# Patient Record
Sex: FEMALE | Race: WHITE | NOT HISPANIC OR LATINO | Employment: PART TIME | URBAN - NONMETROPOLITAN AREA
[De-identification: names, ages, dates, MRNs, and addresses within clinical notes are randomized per-mention and may not be internally consistent; named-entity substitution may affect disease eponyms.]

---

## 2017-03-03 ENCOUNTER — HOSPITAL ENCOUNTER (OUTPATIENT)
Dept: LAB | Facility: MEDICAL CENTER | Age: 32
End: 2017-03-03
Attending: FAMILY MEDICINE
Payer: COMMERCIAL

## 2017-03-03 LAB
ALBUMIN SERPL BCP-MCNC: 3.9 G/DL (ref 3.2–4.9)
ALBUMIN/GLOB SERPL: 1.3 G/DL
ALP SERPL-CCNC: 49 U/L (ref 30–99)
ALT SERPL-CCNC: 6 U/L (ref 2–50)
ANION GAP SERPL CALC-SCNC: 5 MMOL/L (ref 0–11.9)
AST SERPL-CCNC: 12 U/L (ref 12–45)
BASOPHILS # BLD AUTO: 0.1 K/UL (ref 0–0.12)
BASOPHILS NFR BLD AUTO: 2 % (ref 0–1.8)
BILIRUB SERPL-MCNC: 0.5 MG/DL (ref 0.1–1.5)
BUN SERPL-MCNC: 13 MG/DL (ref 8–22)
CALCIUM SERPL-MCNC: 9.1 MG/DL (ref 8.5–10.5)
CHLORIDE SERPL-SCNC: 105 MMOL/L (ref 96–112)
CHOLEST SERPL-MCNC: 187 MG/DL (ref 100–199)
CO2 SERPL-SCNC: 28 MMOL/L (ref 20–33)
CREAT SERPL-MCNC: 0.87 MG/DL (ref 0.5–1.4)
EOSINOPHIL # BLD: 0.15 K/UL (ref 0–0.51)
EOSINOPHIL NFR BLD AUTO: 3 % (ref 0–6.9)
ERYTHROCYTE [DISTWIDTH] IN BLOOD BY AUTOMATED COUNT: 43.7 FL (ref 35.9–50)
EST. AVERAGE GLUCOSE BLD GHB EST-MCNC: 151 MG/DL
GLOBULIN SER CALC-MCNC: 2.9 G/DL (ref 1.9–3.5)
GLUCOSE SERPL-MCNC: 158 MG/DL (ref 65–99)
HBA1C MFR BLD: 6.9 % (ref 0–5.6)
HCT VFR BLD AUTO: 42.8 % (ref 37–47)
HDLC SERPL-MCNC: 68 MG/DL
HGB BLD-MCNC: 13.9 G/DL (ref 12–16)
IMM GRANULOCYTES # BLD AUTO: 0.02 K/UL (ref 0–0.11)
IMM GRANULOCYTES NFR BLD AUTO: 0.4 % (ref 0–0.9)
LDLC SERPL CALC-MCNC: 104 MG/DL
LYMPHOCYTES # BLD: 1.57 K/UL (ref 1–4.8)
LYMPHOCYTES NFR BLD AUTO: 31.4 % (ref 22–41)
MCH RBC QN AUTO: 30.7 PG (ref 27–33)
MCHC RBC AUTO-ENTMCNC: 32.5 G/DL (ref 33.6–35)
MCV RBC AUTO: 94.5 FL (ref 81.4–97.8)
MONOCYTES # BLD: 0.39 K/UL (ref 0–0.85)
MONOCYTES NFR BLD AUTO: 7.8 % (ref 0–13.4)
NEUTROPHILS # BLD: 2.77 K/UL (ref 2–7.15)
NEUTROPHILS NFR BLD AUTO: 55.4 % (ref 44–72)
NRBC # BLD AUTO: 0 K/UL
NRBC BLD-RTO: 0 /100 WBC
PLATELET # BLD AUTO: 273 K/UL (ref 164–446)
PMV BLD AUTO: 10.2 FL (ref 9–12.9)
POTASSIUM SERPL-SCNC: 4.4 MMOL/L (ref 3.6–5.5)
PROT SERPL-MCNC: 6.8 G/DL (ref 6–8.2)
RBC # BLD AUTO: 4.53 M/UL (ref 4.2–5.4)
SODIUM SERPL-SCNC: 138 MMOL/L (ref 135–145)
TRIGL SERPL-MCNC: 73 MG/DL (ref 0–149)
WBC # BLD AUTO: 5 K/UL (ref 4.8–10.8)

## 2017-03-03 PROCEDURE — 36415 COLL VENOUS BLD VENIPUNCTURE: CPT

## 2017-03-03 PROCEDURE — 80053 COMPREHEN METABOLIC PANEL: CPT

## 2017-03-03 PROCEDURE — 83036 HEMOGLOBIN GLYCOSYLATED A1C: CPT

## 2017-03-03 PROCEDURE — 80061 LIPID PANEL: CPT

## 2017-03-03 PROCEDURE — 85025 COMPLETE CBC W/AUTO DIFF WBC: CPT

## 2017-06-18 DIAGNOSIS — N63.0 BREAST LUMP IN UPPER OUTER QUADRANT: ICD-10-CM

## 2017-07-11 ENCOUNTER — HOSPITAL ENCOUNTER (OUTPATIENT)
Dept: RADIOLOGY | Facility: MEDICAL CENTER | Age: 32
End: 2017-07-11
Attending: OBSTETRICS & GYNECOLOGY
Payer: COMMERCIAL

## 2017-07-11 DIAGNOSIS — N63.0 BREAST LUMP IN UPPER OUTER QUADRANT: ICD-10-CM

## 2017-07-11 PROCEDURE — 76642 ULTRASOUND BREAST LIMITED: CPT | Mod: LT

## 2017-08-15 ENCOUNTER — APPOINTMENT (OUTPATIENT)
Dept: DERMATOLOGY | Facility: IMAGING CENTER | Age: 32
End: 2017-08-15

## 2017-09-08 ENCOUNTER — EH NON-PROVIDER (OUTPATIENT)
Dept: OCCUPATIONAL MEDICINE | Facility: CLINIC | Age: 32
End: 2017-09-08

## 2017-09-08 PROCEDURE — 94375 RESPIRATORY FLOW VOLUME LOOP: CPT

## 2017-10-05 ENCOUNTER — IMMUNIZATION (OUTPATIENT)
Dept: OCCUPATIONAL MEDICINE | Facility: CLINIC | Age: 32
End: 2017-10-05

## 2017-10-05 DIAGNOSIS — Z23 NEED FOR VACCINATION: ICD-10-CM

## 2017-10-05 PROCEDURE — 90686 IIV4 VACC NO PRSV 0.5 ML IM: CPT | Performed by: PREVENTIVE MEDICINE

## 2018-01-10 ENCOUNTER — HOSPITAL ENCOUNTER (OUTPATIENT)
Facility: MEDICAL CENTER | Age: 33
End: 2018-01-10
Attending: OBSTETRICS & GYNECOLOGY
Payer: COMMERCIAL

## 2018-01-10 ENCOUNTER — GYNECOLOGY VISIT (OUTPATIENT)
Dept: OBGYN | Facility: CLINIC | Age: 33
End: 2018-01-10
Payer: COMMERCIAL

## 2018-01-10 VITALS
DIASTOLIC BLOOD PRESSURE: 76 MMHG | HEIGHT: 61 IN | WEIGHT: 123 LBS | SYSTOLIC BLOOD PRESSURE: 122 MMHG | BODY MASS INDEX: 23.22 KG/M2

## 2018-01-10 DIAGNOSIS — N93.8 DUB (DYSFUNCTIONAL UTERINE BLEEDING): ICD-10-CM

## 2018-01-10 DIAGNOSIS — E10.9 TYPE 1 DIABETES MELLITUS WITHOUT COMPLICATION (HCC): ICD-10-CM

## 2018-01-10 PROBLEM — O09.291 HX OF PREECLAMPSIA, PRIOR PREGNANCY, CURRENTLY PREGNANT, FIRST TRIMESTER: Status: ACTIVE | Noted: 2018-01-10

## 2018-01-10 LAB — IN CLINIC OB SCAN: NORMAL

## 2018-01-10 PROCEDURE — 87591 N.GONORRHOEAE DNA AMP PROB: CPT

## 2018-01-10 PROCEDURE — 76830 TRANSVAGINAL US NON-OB: CPT | Performed by: OBSTETRICS & GYNECOLOGY

## 2018-01-10 PROCEDURE — 88175 CYTOPATH C/V AUTO FLUID REDO: CPT

## 2018-01-10 PROCEDURE — 87491 CHLMYD TRACH DNA AMP PROBE: CPT

## 2018-01-10 PROCEDURE — 99213 OFFICE O/P EST LOW 20 MIN: CPT | Mod: 25 | Performed by: OBSTETRICS & GYNECOLOGY

## 2018-01-10 NOTE — PROGRESS NOTES
"Fatou Thomas,  32 y.o.  female presents today with a C/O of :amenorrhea. Pt   Patient's last menstrual period was 2017.       Subjective : Nausea/Vomiting: No:  Abdominal /pelvic cramping : No :   vaginal bleeding:No  Patient feeling generally well       GYN ROS:  no breast pain or new or enlarging lumps on self exam, no vaginal bleeding      Past Medical History:   Diagnosis Date   • Diabetes mellitus of mother, complicating pregnancy, childbirth, or the puerperium, unspecified as to episode of care(648.00) 2015   • Encounter for long-term (current) use of insulin (CMS-[facility]) 2015   • Type II or unspecified type diabetes mellitus without mention of complication, not stated as uncontrolled        Past Surgical History:   Procedure Laterality Date   • PRIMARY C SECTION  2016    Procedure: PRIMARY C SECTION;  Surgeon: Miriam Quinones M.D.;  Location: LABOR AND DELIVERY;  Service:    • ARTHROSCOPY, KNEE     • OPEN REDUCTION         Current Birth control:  none    OB History    Para Term  AB Living   2 1 0 1   1   SAB TAB Ectopic Molar Multiple Live Births             1      # Outcome Date GA Lbr Doron/2nd Weight Sex Delivery Anes PTL Lv   2 Current            1  16 35w4d  3.475 kg (7 lb 10.6 oz) F CS-Unspec  N SILVA      Birth Comments: severe preclampsia, failed IOL              Allergy:      Patient has no known allergies.    Exam;    /76   Ht 1.549 m (5' 1\")   Wt 55.8 kg (123 lb)   LMP 2017   Breastfeeding? No   BMI 23.24 kg/m²   Well-developed well-nourished female in no apparent distress  Heart regular rate and rhythm  Lungs clear to auscultation bilaterally  Breasts bilaterally normal with no dominant masses  Abdomen is soft and nontender    Normal external female genitalia  Cervix is closed thick and high  Uterus  9 centimeters  Adnexa are bilaterally nontender with no dominant masses    Lab.    No results found for this or any " previous visit (from the past 336 hour(s)).  Ultrasound :     First trimester findings: Intrauterine gestational sac seen: yes  Gestational sac summary: fetal pole seen, yolk sac seen, fetal cardiac activity, EGA: 9 weeks + 2 days  Adnexa: cyst seen right ovary 1.25 X 2 cm  Probe type: vaginal  Ultrasound was performed and read by me      Assessment:    Intrauterine gestation at 9 weeks and 0 /7 days, with EDC 8/15/18    Plan:  4 weeks  Prenatal labs are ordered  Referral placed for perinatology   Also ordered are 24 hour baseline urine for protein  CMP  Patient has follow-up with Dr. Walton to assist management of diabetes  Patient has history of preeclampsia in previous pregnancy has been asked to begin aspirin after first trimester

## 2018-01-12 LAB
C TRACH DNA GENITAL QL NAA+PROBE: NEGATIVE
CYTOLOGY REG CYTOL: NORMAL
N GONORRHOEA DNA GENITAL QL NAA+PROBE: NEGATIVE
SPECIMEN SOURCE: NORMAL

## 2018-01-23 ENCOUNTER — HOSPITAL ENCOUNTER (OUTPATIENT)
Dept: LAB | Facility: MEDICAL CENTER | Age: 33
End: 2018-01-23
Attending: OBSTETRICS & GYNECOLOGY
Payer: COMMERCIAL

## 2018-01-23 DIAGNOSIS — N93.8 DUB (DYSFUNCTIONAL UTERINE BLEEDING): ICD-10-CM

## 2018-01-23 LAB
ABO GROUP BLD: NORMAL
ALBUMIN SERPL BCP-MCNC: 3.8 G/DL (ref 3.2–4.9)
ALBUMIN/GLOB SERPL: 1.3 G/DL
ALP SERPL-CCNC: 46 U/L (ref 30–99)
ALT SERPL-CCNC: 8 U/L (ref 2–50)
ANION GAP SERPL CALC-SCNC: 6 MMOL/L (ref 0–11.9)
APPEARANCE UR: CLEAR
AST SERPL-CCNC: 11 U/L (ref 12–45)
BASOPHILS # BLD AUTO: 1 % (ref 0–1.8)
BASOPHILS # BLD: 0.09 K/UL (ref 0–0.12)
BILIRUB SERPL-MCNC: 0.6 MG/DL (ref 0.1–1.5)
BILIRUB UR QL STRIP.AUTO: NEGATIVE
BLD GP AB SCN SERPL QL: NORMAL
BUN SERPL-MCNC: 11 MG/DL (ref 8–22)
CALCIUM SERPL-MCNC: 9.3 MG/DL (ref 8.5–10.5)
CHLORIDE SERPL-SCNC: 103 MMOL/L (ref 96–112)
CO2 SERPL-SCNC: 25 MMOL/L (ref 20–33)
COLOR UR: YELLOW
CREAT SERPL-MCNC: 0.69 MG/DL (ref 0.5–1.4)
CULTURE IF INDICATED INDCX: NO UA CULTURE
EOSINOPHIL # BLD AUTO: 0.15 K/UL (ref 0–0.51)
EOSINOPHIL NFR BLD: 1.6 % (ref 0–6.9)
ERYTHROCYTE [DISTWIDTH] IN BLOOD BY AUTOMATED COUNT: 43.8 FL (ref 35.9–50)
GLOBULIN SER CALC-MCNC: 2.9 G/DL (ref 1.9–3.5)
GLUCOSE SERPL-MCNC: 278 MG/DL (ref 65–99)
GLUCOSE UR STRIP.AUTO-MCNC: >=1000 MG/DL
HBV SURFACE AG SER QL: NEGATIVE
HCT VFR BLD AUTO: 40.5 % (ref 37–47)
HGB BLD-MCNC: 13.2 G/DL (ref 12–16)
HIV 1+2 AB+HIV1 P24 AG SERPL QL IA: NON REACTIVE
IMM GRANULOCYTES # BLD AUTO: 0.03 K/UL (ref 0–0.11)
IMM GRANULOCYTES NFR BLD AUTO: 0.3 % (ref 0–0.9)
KETONES UR STRIP.AUTO-MCNC: NEGATIVE MG/DL
LEUKOCYTE ESTERASE UR QL STRIP.AUTO: NEGATIVE
LYMPHOCYTES # BLD AUTO: 1.46 K/UL (ref 1–4.8)
LYMPHOCYTES NFR BLD: 15.9 % (ref 22–41)
MCH RBC QN AUTO: 30.8 PG (ref 27–33)
MCHC RBC AUTO-ENTMCNC: 32.6 G/DL (ref 33.6–35)
MCV RBC AUTO: 94.6 FL (ref 81.4–97.8)
MICRO URNS: ABNORMAL
MONOCYTES # BLD AUTO: 0.51 K/UL (ref 0–0.85)
MONOCYTES NFR BLD AUTO: 5.5 % (ref 0–13.4)
NEUTROPHILS # BLD AUTO: 6.96 K/UL (ref 2–7.15)
NEUTROPHILS NFR BLD: 75.7 % (ref 44–72)
NITRITE UR QL STRIP.AUTO: NEGATIVE
NRBC # BLD AUTO: 0 K/UL
NRBC BLD-RTO: 0 /100 WBC
PH UR STRIP.AUTO: 6.5 [PH]
PLATELET # BLD AUTO: 242 K/UL (ref 164–446)
PMV BLD AUTO: 10.6 FL (ref 9–12.9)
POTASSIUM SERPL-SCNC: 4.4 MMOL/L (ref 3.6–5.5)
PROT SERPL-MCNC: 6.7 G/DL (ref 6–8.2)
PROT UR QL STRIP: NEGATIVE MG/DL
RBC # BLD AUTO: 4.28 M/UL (ref 4.2–5.4)
RBC UR QL AUTO: NEGATIVE
RH BLD: NORMAL
RUBV AB SER QL: 95.2 IU/ML
SODIUM SERPL-SCNC: 134 MMOL/L (ref 135–145)
SP GR UR STRIP.AUTO: 1.01
TREPONEMA PALLIDUM IGG+IGM AB [PRESENCE] IN SERUM OR PLASMA BY IMMUNOASSAY: NON REACTIVE
UROBILINOGEN UR STRIP.AUTO-MCNC: 0.2 MG/DL
WBC # BLD AUTO: 9.2 K/UL (ref 4.8–10.8)

## 2018-01-23 PROCEDURE — 87389 HIV-1 AG W/HIV-1&-2 AB AG IA: CPT

## 2018-01-23 PROCEDURE — 85025 COMPLETE CBC W/AUTO DIFF WBC: CPT

## 2018-01-23 PROCEDURE — 86901 BLOOD TYPING SEROLOGIC RH(D): CPT

## 2018-01-23 PROCEDURE — 80053 COMPREHEN METABOLIC PANEL: CPT

## 2018-01-23 PROCEDURE — 36415 COLL VENOUS BLD VENIPUNCTURE: CPT

## 2018-01-23 PROCEDURE — 81003 URINALYSIS AUTO W/O SCOPE: CPT

## 2018-01-23 PROCEDURE — 86762 RUBELLA ANTIBODY: CPT

## 2018-01-23 PROCEDURE — 86850 RBC ANTIBODY SCREEN: CPT

## 2018-01-23 PROCEDURE — 87340 HEPATITIS B SURFACE AG IA: CPT

## 2018-01-23 PROCEDURE — 86900 BLOOD TYPING SEROLOGIC ABO: CPT

## 2018-01-23 PROCEDURE — 86780 TREPONEMA PALLIDUM: CPT

## 2018-01-24 ENCOUNTER — HOSPITAL ENCOUNTER (OUTPATIENT)
Facility: MEDICAL CENTER | Age: 33
End: 2018-01-24
Attending: OBSTETRICS & GYNECOLOGY
Payer: COMMERCIAL

## 2018-01-24 DIAGNOSIS — N93.8 DUB (DYSFUNCTIONAL UTERINE BLEEDING): ICD-10-CM

## 2018-01-24 LAB
PROT 24H UR-MCNC: NORMAL MG/24 HR (ref 30–150)
PROT 24H UR-MRATE: <4 MG/DL (ref 0–15)
SPECIMEN VOL UR: 3025 ML

## 2018-01-24 PROCEDURE — 81050 URINALYSIS VOLUME MEASURE: CPT

## 2018-01-24 PROCEDURE — 84156 ASSAY OF PROTEIN URINE: CPT

## 2018-02-07 ENCOUNTER — INITIAL PRENATAL (OUTPATIENT)
Dept: OBGYN | Facility: CLINIC | Age: 33
End: 2018-02-07
Payer: COMMERCIAL

## 2018-02-07 VITALS — BODY MASS INDEX: 23.81 KG/M2 | WEIGHT: 126 LBS | SYSTOLIC BLOOD PRESSURE: 122 MMHG | DIASTOLIC BLOOD PRESSURE: 80 MMHG

## 2018-02-07 DIAGNOSIS — O09.92 SUPERVISION OF HIGH RISK PREGNANCY IN SECOND TRIMESTER: ICD-10-CM

## 2018-02-07 DIAGNOSIS — Z98.891 PREVIOUS CESAREAN SECTION: ICD-10-CM

## 2018-02-07 PROCEDURE — 59402 PR NEW OB HIGH RISK: CPT | Performed by: OBSTETRICS & GYNECOLOGY

## 2018-02-07 NOTE — PROGRESS NOTES
Fatou Thomas is a 32 y.o.  at 13w0d here today for obstetrical visit.  Patient is without complaints.    She reports good fetal movement.  She denies vaginal bleeding.  She denies rupture of membranes.  She denies contractions.     has Type 1 diabetes mellitus (CMS-HCC); Hypertension; Encounter for long-term (current) use of insulin (CMS-HCC); and Hx of preeclampsia, prior pregnancy, currently pregnant, first trimester on her problem list.    Type 1 diabetes followed by endocrine  Baseline average for blood sugars over the last few weeks 128  Started aspirin daily  Has seen perinatology and has follow-up    Past medical history is reviewed and updated  Past surgical history is reviewed and updated  Medications reviewed and updated  Allergies reviewed and updated  Social history reviewed and updated  Family history reviewed and updated    Patient states had elevated blood pressures while at perinatology , normal today. We'll follow up in 2 weeks to recheck blood pressure    A/P IUP at 13w0d  AFP first trimester screening  1 hour glucola  known diabetic  Rhogam opos  GBS done    F/U in 2 weeks

## 2018-02-07 NOTE — PROGRESS NOTES
Doing well. Seen by  yesterday had minimally elevated bp's. Has had all labs and baseline 24hr ua =<4.0.

## 2018-02-09 ENCOUNTER — HOSPITAL ENCOUNTER (OUTPATIENT)
Dept: LAB | Facility: MEDICAL CENTER | Age: 33
End: 2018-02-09
Attending: OBSTETRICS & GYNECOLOGY
Payer: COMMERCIAL

## 2018-02-09 PROCEDURE — 36415 COLL VENOUS BLD VENIPUNCTURE: CPT

## 2018-02-09 PROCEDURE — 81508 FTL CGEN ABNOR TWO PROTEINS: CPT

## 2018-02-13 LAB
# FETUSES US: NORMAL
AGE - REPORTED: 31 YR
COLLECT DATE: NORMAL
FET CRL US.MEAS: 6.92 CM
FET NUCHAL FOLD MOM THICKNESS US.MEAS: 0.93
FET NUCHAL FOLD THICKNESS US.MEAS: 1.5 MM
GA: 13.57 WEEKS
HCG MOM SERPL: 0.58
HCG SERPL-ACNC: NORMAL IU/L
INTEGRATED SCN PATIENT-IMP: NORMAL
PAPP-A MOM SERPL: 0.51
PAPP-A SERPL-ACNC: 1806 MIU/L
PATHOLOGY STUDY: NORMAL
SONOGRAPHER NAME: NORMAL
SONOGRAPHER: NORMAL
US DATE: NORMAL

## 2018-02-15 ENCOUNTER — HOSPITAL ENCOUNTER (OUTPATIENT)
Dept: LAB | Facility: MEDICAL CENTER | Age: 33
End: 2018-02-15
Attending: INTERNAL MEDICINE
Payer: COMMERCIAL

## 2018-02-15 LAB
EST. AVERAGE GLUCOSE BLD GHB EST-MCNC: 131 MG/DL
HBA1C MFR BLD: 6.2 % (ref 0–5.6)

## 2018-02-15 PROCEDURE — 83036 HEMOGLOBIN GLYCOSYLATED A1C: CPT

## 2018-02-15 PROCEDURE — 36415 COLL VENOUS BLD VENIPUNCTURE: CPT

## 2018-02-22 ENCOUNTER — NON-PROVIDER VISIT (OUTPATIENT)
Dept: OBGYN | Facility: CLINIC | Age: 33
End: 2018-02-22
Payer: COMMERCIAL

## 2018-02-22 VITALS — SYSTOLIC BLOOD PRESSURE: 128 MMHG | DIASTOLIC BLOOD PRESSURE: 82 MMHG

## 2018-02-22 DIAGNOSIS — Z98.891 PREVIOUS CESAREAN SECTION: ICD-10-CM

## 2018-02-22 DIAGNOSIS — E10.65 TYPE 1 DIABETES MELLITUS WITH HYPERGLYCEMIA (HCC): ICD-10-CM

## 2018-02-22 DIAGNOSIS — Z79.4 ENCOUNTER FOR LONG-TERM (CURRENT) USE OF INSULIN (HCC): ICD-10-CM

## 2018-03-05 ENCOUNTER — HOSPITAL ENCOUNTER (OUTPATIENT)
Dept: LAB | Facility: MEDICAL CENTER | Age: 33
End: 2018-03-05
Attending: OBSTETRICS & GYNECOLOGY
Payer: COMMERCIAL

## 2018-03-05 PROCEDURE — 36415 COLL VENOUS BLD VENIPUNCTURE: CPT

## 2018-03-05 PROCEDURE — 82105 ALPHA-FETOPROTEIN SERUM: CPT

## 2018-03-08 LAB
# FETUSES US: NORMAL
AFP MOM SERPL: 1.76
AFP SERPL-MCNC: 71 NG/ML
AGE - REPORTED: 31.1 YR
CURRENT SMOKER: NO
FAMILY MEMBER DISEASES HX: NO
GA METHOD: NORMAL
GA: NORMAL WK
IDDM PATIENT QL: YES
INTEGRATED SCN PATIENT-IMP: NORMAL
SPECIMEN DRAWN SERPL: NORMAL

## 2018-03-12 ENCOUNTER — ROUTINE PRENATAL (OUTPATIENT)
Dept: OBGYN | Facility: CLINIC | Age: 33
End: 2018-03-12
Payer: COMMERCIAL

## 2018-03-12 VITALS — DIASTOLIC BLOOD PRESSURE: 80 MMHG | SYSTOLIC BLOOD PRESSURE: 132 MMHG | WEIGHT: 133 LBS | BODY MASS INDEX: 25.13 KG/M2

## 2018-03-12 DIAGNOSIS — Z98.891 PREVIOUS CESAREAN SECTION: ICD-10-CM

## 2018-03-12 DIAGNOSIS — O10.919 HTN IN PREGNANCY, CHRONIC: ICD-10-CM

## 2018-03-12 DIAGNOSIS — O09.92 HIGH-RISK PREGNANCY, SECOND TRIMESTER: ICD-10-CM

## 2018-03-12 PROCEDURE — 90040 PR PRENATAL FOLLOW UP: CPT | Performed by: OBSTETRICS & GYNECOLOGY

## 2018-03-12 NOTE — PROGRESS NOTES
S: Pt here for OB follow up. Doing well.   Not feeling fetal movements yet    negative vaginal bleeding.    negative leakage of fluid.    negative contractions.    Reviewed blood sugar log with patient.  Reviewed diet.  Questions answered.    O: /90 --> 132/80, Afeb      See prenatal vitals, chart for today's exam   w/ ultrasound    Working with Dr. Walton (endocrinology) for blood sugar management    Insulin regimen  Long acting insulin at night 15 units  SSI Novolog daily  7:1 Carb Ratio      A/P:  32 y.o.  17w5d with Type 1 DM who presents for obstetric follow-up.  Chronic HTN    1.  Labor precautions and fetal kick counts educated  2.  Cont to work w/ Dr. Walton for blood sugar management  3.  NSTs: 2x/week to begin at 32 weeks.  4.  Continue prenatal vitamins.  5.  Watch weight gain.  Exercise at least 30 minutes daily  6.  Increase water intake.  7.  Encouraged prenatal classes.  8.  Follow-up in 4 weeks for obstetric follow-up.  9.  Monitor blood pressures closely, cont baby ASA daily

## 2018-03-23 ENCOUNTER — HOSPITAL ENCOUNTER (OUTPATIENT)
Dept: LAB | Facility: MEDICAL CENTER | Age: 33
End: 2018-03-23
Attending: INTERNAL MEDICINE
Payer: COMMERCIAL

## 2018-03-23 LAB
EST. AVERAGE GLUCOSE BLD GHB EST-MCNC: 128 MG/DL
HBA1C MFR BLD: 6.1 % (ref 0–5.6)

## 2018-03-23 PROCEDURE — 36415 COLL VENOUS BLD VENIPUNCTURE: CPT

## 2018-03-23 PROCEDURE — 83036 HEMOGLOBIN GLYCOSYLATED A1C: CPT

## 2018-04-03 ENCOUNTER — ROUTINE PRENATAL (OUTPATIENT)
Dept: OBGYN | Facility: CLINIC | Age: 33
End: 2018-04-03
Payer: COMMERCIAL

## 2018-04-03 VITALS — DIASTOLIC BLOOD PRESSURE: 82 MMHG | BODY MASS INDEX: 25.7 KG/M2 | SYSTOLIC BLOOD PRESSURE: 124 MMHG | WEIGHT: 136 LBS

## 2018-04-03 DIAGNOSIS — O10.919 HTN IN PREGNANCY, CHRONIC: ICD-10-CM

## 2018-04-03 DIAGNOSIS — Z98.891 PREVIOUS CESAREAN SECTION: ICD-10-CM

## 2018-04-03 PROCEDURE — 90040 PR PRENATAL FOLLOW UP: CPT | Performed by: OBSTETRICS & GYNECOLOGY

## 2018-04-03 RX ORDER — METHYLDOPA 250 MG/1
250 TABLET, FILM COATED ORAL EVERY 12 HOURS
Qty: 60 TAB | Refills: 3 | Status: SHIPPED | OUTPATIENT
Start: 2018-04-03 | End: 2018-07-18

## 2018-04-03 NOTE — PROGRESS NOTES
32 y.o.   20w6d with diagnosis of GDM: pre-existing. Chronic HTN     Subjective: Fetal movement: positive, Vaginal Bleeding:negative, Loss of Fluid: negative, Following diet :positive, Insulin Use:positive. Blood sugar logs reviewed and are posted in diabetic flowsheet.   Patient Active Problem List    Diagnosis Date Noted   • Previous  section 2018     Priority: High   • Hx of preeclampsia, prior pregnancy, currently pregnant, first trimester 01/10/2018   • High-risk pregnancy, second trimester 2015   • Encounter for long-term (current) use of insulin (CMS-HCC) 2015   • Type 1 diabetes mellitus (CMS-HCC) 2015   • HTN in pregnancy, chronic 2015       Current Treatment:     AM     intensive insulin injection program    PM:    intensive insulin injection program    QHS   intensive insulin injection program    FBS Range: Dexcom machine : shows averages and occasional s[pike   Postprandial Range:    Vitals:    18 1628   BP: 124/82   Weight: 61.7 kg (136 lb)       NST:    discussed    Ass:     20w6d  GDM: Class ___D ___  Good control positive  Chronic HTN   Hx of Superimposed Pre eclampsia     P. New(No)  Continue Same ( Yes )Diabetic treatment Plan  AM: intensive insulin injection program  PM intensive insulin injection program  QHS:intensive insulin injection program  Aldomet 250 mg BID   Needs appointment with Eye doc for exam   NST 2x /week after 32 weeks  Frequent assessment of  Fetal weight  IOL /C/S delivery at 38-39 weeks

## 2018-04-05 ENCOUNTER — TELEPHONE (OUTPATIENT)
Dept: OBGYN | Facility: CLINIC | Age: 33
End: 2018-04-05

## 2018-04-05 NOTE — TELEPHONE ENCOUNTER
----- Message from Cristi Madden Ass't sent at 4/5/2018 10:15 AM PDT -----  Regarding: FW: Prescription Question  Contact: [phone]      ----- Message -----  From: Fatou Thomas  Sent: 4/4/2018  12:24 PM  To: [facility]  Subject: Prescription Question                            Blanca Quinones I wanted to talk with you after my appointment that I had with Dr. Cabrera on 4/3. He wants to start me on BP medication and I wanted to see what you thought about that? He said it was for 'chronic HTN', even though my BP in the office was 124/82. He wrote a prescription for me to start taking Aldoment BID. He also wanted to see me back in 2 weeks instead of 4 weeks. I had to make the appointment to see him again because you are booked. I had some not so pleasant words with the front staff at your office about not being able to see you as my Primary OB, I am beyond frustrated with how they are running [facility] now. I chose to come to see you, not everyone in the office. If you have any pull toward making so I only see you      Called pt back and and explained Dr. Quinones is out on a personal emergency and we are hoping she will be back by next week but unsure. Explained I discussed with Dr. Cabrera reason for her to start on aldomet. Pt stated she did not know the email she sent to Dr. Quinones was going to be seen by anybody else. Pt states  She does not want to discuss it with Dr. Cabrera and she rather wait for Dr. Quinones and she will talk to Dr. Quinones when she comes back. Explained to pt I will take a look at the schedule and see if I can do something for her to be able to see Dr. Quinones on 4/17/18. Explained to pt I will be calling her back with options if there is any. Pt agreed.  1320 called pt back and left message to call back regarding appt time for 4/17/18.   1420 pt called back and notified we are making an exception this time and we can move her to 1015 on 4/17/18 but we cannot make any  guarantees that  She will be schedule with Dr. Quinones every time. Pt agreed and verbalized understanding. Pt requesting to get a call when June's schedule opens to schedule her appt in advance. Jamal notified and will call pt.

## 2018-04-17 ENCOUNTER — ROUTINE PRENATAL (OUTPATIENT)
Dept: OBGYN | Facility: CLINIC | Age: 33
End: 2018-04-17
Payer: COMMERCIAL

## 2018-04-17 VITALS — BODY MASS INDEX: 26.26 KG/M2 | SYSTOLIC BLOOD PRESSURE: 124 MMHG | WEIGHT: 139 LBS | DIASTOLIC BLOOD PRESSURE: 82 MMHG

## 2018-04-17 DIAGNOSIS — Z98.891 PREVIOUS CESAREAN SECTION: ICD-10-CM

## 2018-04-17 DIAGNOSIS — O10.919 HTN IN PREGNANCY, CHRONIC: ICD-10-CM

## 2018-04-17 PROCEDURE — 90040 PR PRENATAL FOLLOW UP: CPT | Performed by: OBSTETRICS & GYNECOLOGY

## 2018-04-17 RX ORDER — TRIAMCINOLONE ACETONIDE 1 MG/G
OINTMENT TOPICAL
Qty: 1 TUBE | Refills: 2 | Status: ON HOLD | OUTPATIENT
Start: 2018-04-17 | End: 2018-06-22

## 2018-04-17 NOTE — LETTER
April 17, 2018       Patient: Fatou Thomas   YOB: 1985   Date of Visit: 4/17/2018         To Whom It May Concern:    It is my medical opinion that it was a medical necessity that Fatou Thomas needed to complete prenatal labs between the dates of 1/10/2018 and 2/7/2018.    If you have any questions or concerns, please don't hesitate to call [phone]          Sincerely,          Miriam Quinones M.D.  Electronically Signed

## 2018-04-17 NOTE — PROGRESS NOTES
Fatou Thomas 32 y.o.  22w6d here today for obstetrical visit. Patient reports good  fetal movement. denies contractions, denies vaginal bleeding, denies loss of fluid.    Pregnancy is complicated by   Patient Active Problem List    Diagnosis Date Noted   • Previous  section 2018     Priority: High   • HTN in pregnancy, chronic 2015     Priority: High   • Hx of preeclampsia, prior pregnancy, currently pregnant, first trimester 01/10/2018   • High-risk pregnancy, second trimester 2015   • Encounter for long-term (current) use of insulin (CMS-HCC) 2015   • Type 1 diabetes mellitus (CMS-HCC) 2015       GBS not done  Blood sugar followed by endocrine    Follow-up with Dr. Chauhan today  Patient was given Aldomet for blood pressure was as low blood pressures were normal  Would like to have patient discuss beginning blood pressure medication with perinatology this afternoon.      Insulin regimen  Per endocrine    Followup in 2 weeks   labor precautions are reviewed  Continue kick counts daily after 28 weeks  NST twice weekly after 32 weeks if on insulin

## 2018-04-17 NOTE — LETTER
April 17, 2018       Patient: Fatou Thomas   YOB: 1985   Date of Visit: 4/17/2018         To Whom It May Concern:    It is my medical opinion that Fatou Thomas needed to complete prenatal labs between the dates of 1/10/2018 and 2/7/2018 due to previous high-risk pregnancy.    If you have any questions or concerns, please don't hesitate to call [phone]          Sincerely,          Miriam Quinones M.D.  Electronically Signed

## 2018-04-18 ENCOUNTER — HOSPITAL ENCOUNTER (OUTPATIENT)
Dept: LAB | Facility: MEDICAL CENTER | Age: 33
End: 2018-04-18
Attending: INTERNAL MEDICINE
Payer: COMMERCIAL

## 2018-04-18 ENCOUNTER — TELEPHONE (OUTPATIENT)
Dept: OBGYN | Facility: MEDICAL CENTER | Age: 33
End: 2018-04-18

## 2018-04-18 PROCEDURE — 36415 COLL VENOUS BLD VENIPUNCTURE: CPT

## 2018-04-18 PROCEDURE — 83036 HEMOGLOBIN GLYCOSYLATED A1C: CPT

## 2018-04-18 NOTE — TELEPHONE ENCOUNTER
Gillian from the pharmacy called and asked if they could change the kenalog ointment to the kenalog cream for the patient dur to being cheaper and working better for the patient as she has had the cream previously and it has worked for her.

## 2018-04-19 LAB
EST. AVERAGE GLUCOSE BLD GHB EST-MCNC: 143 MG/DL
HBA1C MFR BLD: 6.6 % (ref 0–5.6)

## 2018-05-01 ENCOUNTER — ROUTINE PRENATAL (OUTPATIENT)
Dept: OBGYN | Facility: CLINIC | Age: 33
End: 2018-05-01
Payer: COMMERCIAL

## 2018-05-01 VITALS — BODY MASS INDEX: 26.83 KG/M2 | DIASTOLIC BLOOD PRESSURE: 76 MMHG | WEIGHT: 142 LBS | SYSTOLIC BLOOD PRESSURE: 134 MMHG

## 2018-05-01 DIAGNOSIS — Z98.891 PREVIOUS CESAREAN SECTION: ICD-10-CM

## 2018-05-01 DIAGNOSIS — O10.919 HTN IN PREGNANCY, CHRONIC: ICD-10-CM

## 2018-05-01 PROCEDURE — 90040 PR PRENATAL FOLLOW UP: CPT | Performed by: OBSTETRICS & GYNECOLOGY

## 2018-05-01 NOTE — PROGRESS NOTES
Fatou Thomas 32 y.o.  24w6d here today for obstetrical visit. Patient reports good  fetal movement. denies contractions, denies vaginal bleeding, denies loss of fluid.    Pregnancy is complicated by   Patient Active Problem List    Diagnosis Date Noted   • Previous  section 2018     Priority: High   • HTN in pregnancy, chronic 2015     Priority: High   • Hx of preeclampsia, prior pregnancy, currently pregnant, first trimester 01/10/2018   • High-risk pregnancy, second trimester 2015   • Encounter for long-term (current) use of insulin (HCC) 2015   • Type 1 diabetes mellitus (HCC) 2015       GBS not done  Followed by endocrine      Insulin regimen  Per endocrine    Followup in 2 weeks   labor precautions are reviewed  Continue kick counts daily after 28 weeks  NST twice weekly after 32 weeks if on insulin

## 2018-05-17 ENCOUNTER — HOSPITAL ENCOUNTER (OUTPATIENT)
Dept: LAB | Facility: MEDICAL CENTER | Age: 33
End: 2018-05-17
Attending: INTERNAL MEDICINE
Payer: COMMERCIAL

## 2018-05-17 ENCOUNTER — ROUTINE PRENATAL (OUTPATIENT)
Dept: OBGYN | Facility: CLINIC | Age: 33
End: 2018-05-17
Payer: COMMERCIAL

## 2018-05-17 VITALS — BODY MASS INDEX: 27.78 KG/M2 | WEIGHT: 147 LBS | DIASTOLIC BLOOD PRESSURE: 80 MMHG | SYSTOLIC BLOOD PRESSURE: 122 MMHG

## 2018-05-17 DIAGNOSIS — Z98.891 PREVIOUS CESAREAN SECTION: ICD-10-CM

## 2018-05-17 DIAGNOSIS — I10 ESSENTIAL HYPERTENSION: ICD-10-CM

## 2018-05-17 DIAGNOSIS — O10.919 HTN IN PREGNANCY, CHRONIC: ICD-10-CM

## 2018-05-17 LAB
EST. AVERAGE GLUCOSE BLD GHB EST-MCNC: 143 MG/DL
HBA1C MFR BLD: 6.6 % (ref 0–5.6)

## 2018-05-17 PROCEDURE — 90040 PR PRENATAL FOLLOW UP: CPT | Performed by: OBSTETRICS & GYNECOLOGY

## 2018-05-17 PROCEDURE — 36415 COLL VENOUS BLD VENIPUNCTURE: CPT

## 2018-05-17 PROCEDURE — 83036 HEMOGLOBIN GLYCOSYLATED A1C: CPT

## 2018-05-17 NOTE — PROGRESS NOTES
Fatou Thomas 32 y.o.  27w1d here today for obstetrical visit. Patient reports good  fetal movement. denies contractions, denies vaginal bleeding, denies loss of fluid.    Pregnancy is complicated by   Patient Active Problem List    Diagnosis Date Noted   • Previous  section 2018     Priority: High   • HTN in pregnancy, chronic 2015     Priority: High   • Essential hypertension 2018   • Pregnancy and insulin-dependent diabetes mellitus in second trimester ([facility]) 2018   • Hx of preeclampsia, prior pregnancy, currently pregnant, first trimester 01/10/2018   • High-risk pregnancy, second trimester 2015   • Encounter for long-term (current) use of insulin ([facility]) 2015   • Type 1 diabetes mellitus ([facility]) 2015       GBS not done  Fasting blood sugars range blood sugars followed by endocrine  Postprandial blood sugars range     Recent visit to perinatology normal fetal growth, LEYDI is 16    Insulin regimen  Per endocrinology    Followup in 2 weeks   labor precautions are reviewed  Continue kick counts daily after 28 weeks  NST twice weekly after 32 weeks if on insulin

## 2018-05-17 NOTE — LETTER
"Count Your Baby's Movements  Another step to a healthy delivery        How Many Weeks Pregnant? 27 weeks 1 day  Date to Begin Countin18              How to use this chart    One way for your physician to keep track of your baby's health is by knowing how often the baby moves (or \"kicks\") in your womb.  You can help your physician to do this by using this chart every day.    Every day, you should see how many hours it takes for your baby to move 10 times.  Start in the morning, as soon as you get up.    · First, write down the time your baby moves until you get to 10.  · Check off one box every time your baby moves until you get to 10.  · Write down the time you finished counting in the last column.  · Total how long it took to count up all 10 movements.  · Finally, fill in the box that shows how long this took.  After counting 10 movements, you no longer have to count any more that day.  The next morning, just start counting again as soon as you get up.    What should you call a \"movement\"?  It is hard to say, because it will feel different from one mother to another and from one pregnancy to the next.  The important thing is that you count the movements the same way throughout your pregnancy.  If you have more questions, you should ask your physician.    Count carefully every day!  SAMPLE:  Week 28    How many hours did it take to feel 10 movements?       Start  Time     1     2     3     4     5     6     7     8     9     10   Finish Time   Mon 8:20 ·  ·  ·  ·  ·  ·  ·  ·  ·  ·  11:40                  Sat               Sun                 IMPORTANT: You should contact your physician if it takes more than two hours for you to feel 10 movements.  Each morning, write down the time and start to count the movements of your baby.  Keep track by checking off one box every time you feel one movement.  When you have felt 10 \"kicks\", write down the time you " finished counting in the last column.  Then fill in the   box (over the check chilo) for the number of hours it took.  Be sure to read the complete instructions on the previous page.

## 2018-05-23 ENCOUNTER — DATING (OUTPATIENT)
Dept: OBGYN | Facility: MEDICAL CENTER | Age: 33
End: 2018-05-23

## 2018-05-23 DIAGNOSIS — O10.919 HTN IN PREGNANCY, CHRONIC: ICD-10-CM

## 2018-05-30 ENCOUNTER — ROUTINE PRENATAL (OUTPATIENT)
Dept: OBGYN | Facility: CLINIC | Age: 33
End: 2018-05-30
Payer: COMMERCIAL

## 2018-05-30 VITALS — WEIGHT: 148 LBS | SYSTOLIC BLOOD PRESSURE: 122 MMHG | BODY MASS INDEX: 27.96 KG/M2 | DIASTOLIC BLOOD PRESSURE: 80 MMHG

## 2018-05-30 DIAGNOSIS — Z98.891 PREVIOUS CESAREAN SECTION: ICD-10-CM

## 2018-05-30 DIAGNOSIS — O10.919 HTN IN PREGNANCY, CHRONIC: ICD-10-CM

## 2018-05-30 DIAGNOSIS — I10 ESSENTIAL HYPERTENSION: ICD-10-CM

## 2018-05-30 PROCEDURE — 90040 PR PRENATAL FOLLOW UP: CPT | Performed by: OBSTETRICS & GYNECOLOGY

## 2018-05-30 NOTE — PROGRESS NOTES
Fatou Thomas 32 y.o.  29w0d here today for obstetrical visit. Patient reports good  fetal movement. denies contractions, denies vaginal bleeding, denies loss of fluid.    Pregnancy is complicated by   Patient Active Problem List    Diagnosis Date Noted   • Previous  section 2018     Priority: High   • HTN in pregnancy, chronic 2015     Priority: High   • Essential hypertension 2018   • Pregnancy and insulin-dependent diabetes mellitus in second trimester ([facility]) 2018   • Hx of preeclampsia, prior pregnancy, currently pregnant, first trimester 01/10/2018   • High-risk pregnancy, second trimester 2015   • Encounter for long-term (current) use of insulin ([facility]) 2015   • Type 1 diabetes mellitus ([facility]) 2015       GBS not done  Fasting blood sugars range followed by Dr. Walton  Postprandial blood sugars range     Hemoglobin A1c 6.6    Insulin regimen  Managed by Dr. Walton    Currently taking Aldomet 250 twice a day  Has follow-up with Dr. Chauhan in 2 weeks    Followup in 2 weeks   labor precautions are reviewed  Continue kick counts daily after 28 weeks  NST twice weekly after 32 weeks if on insulin

## 2018-06-13 ENCOUNTER — ROUTINE PRENATAL (OUTPATIENT)
Dept: OBGYN | Facility: CLINIC | Age: 33
End: 2018-06-13
Payer: COMMERCIAL

## 2018-06-13 ENCOUNTER — HOSPITAL ENCOUNTER (OUTPATIENT)
Dept: LAB | Facility: MEDICAL CENTER | Age: 33
End: 2018-06-13
Attending: INTERNAL MEDICINE
Payer: COMMERCIAL

## 2018-06-13 VITALS — WEIGHT: 148 LBS | BODY MASS INDEX: 27.96 KG/M2 | DIASTOLIC BLOOD PRESSURE: 80 MMHG | SYSTOLIC BLOOD PRESSURE: 145 MMHG

## 2018-06-13 DIAGNOSIS — Z98.891 PREVIOUS CESAREAN SECTION: ICD-10-CM

## 2018-06-13 DIAGNOSIS — O10.919 HTN IN PREGNANCY, CHRONIC: ICD-10-CM

## 2018-06-13 DIAGNOSIS — I10 ESSENTIAL HYPERTENSION: ICD-10-CM

## 2018-06-13 LAB
EST. AVERAGE GLUCOSE BLD GHB EST-MCNC: 131 MG/DL
HBA1C MFR BLD: 6.2 % (ref 0–5.6)

## 2018-06-13 PROCEDURE — 90040 PR PRENATAL FOLLOW UP: CPT | Performed by: OBSTETRICS & GYNECOLOGY

## 2018-06-13 PROCEDURE — 83036 HEMOGLOBIN GLYCOSYLATED A1C: CPT

## 2018-06-13 PROCEDURE — 36415 COLL VENOUS BLD VENIPUNCTURE: CPT

## 2018-06-13 NOTE — PROGRESS NOTES
Fatou Thomas 32 y.o.  31w0d here today for obstetrical visit. Patient reports good  fetal movement. denies contractions, denies vaginal bleeding, denies loss of fluid.    Pregnancy is complicated by   Patient Active Problem List    Diagnosis Date Noted   • Previous  section 2018     Priority: High   • HTN in pregnancy, chronic 2015     Priority: High   • Essential hypertension 2018   • Pregnancy and insulin-dependent diabetes mellitus in second trimester ([facility]) 2018   • Hx of preeclampsia, prior pregnancy, currently pregnant, first trimester 01/10/2018   • High-risk pregnancy, second trimester 2015   • Encounter for long-term (current) use of insulin ([facility]) 2015   • Type 1 diabetes mellitus ([facility]) 2015       GBS not done  Fasting blood sugars range   Postprandial blood sugars range followed by endocrinology    Patient's blood pressure slightly high today  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 Blood pressures are minimally elevated  Currently on Aldomet 250 mg by mouth twice a day  Will repeat 24-hour urine and preeclampsia labs  Patient has follow-up with Dr. Maldonado next week NST at that time  Also NST to be done next Friday  We'll begin twice weekly NSTs after 32 weeks    Insulin regimen  Maintained by endocrinology-    Followup in 1 weeks   labor precautions are reviewed  Continue kick counts daily after 28 weeks  NST twice weekly after 32 weeks if on insulin

## 2018-06-13 NOTE — PROGRESS NOTES
OB Visit @ 31 wks.Pt doing well. Active FM.Denies ha's ,swelling or visual changes. Timo maryjane UC's infrequent .Repeat  not scheduled yet pending symptoms.

## 2018-06-19 ENCOUNTER — HOSPITAL ENCOUNTER (OUTPATIENT)
Facility: MEDICAL CENTER | Age: 33
End: 2018-06-19
Attending: OBSTETRICS & GYNECOLOGY
Payer: COMMERCIAL

## 2018-06-19 LAB
ALBUMIN SERPL BCP-MCNC: 3.2 G/DL (ref 3.2–4.9)
ALBUMIN/GLOB SERPL: 1.2 G/DL
ALP SERPL-CCNC: 88 U/L (ref 30–99)
ALT SERPL-CCNC: 10 U/L (ref 2–50)
ANION GAP SERPL CALC-SCNC: 9 MMOL/L (ref 0–11.9)
AST SERPL-CCNC: 13 U/L (ref 12–45)
BASOPHILS # BLD AUTO: 0.6 % (ref 0–1.8)
BASOPHILS # BLD: 0.05 K/UL (ref 0–0.12)
BILIRUB SERPL-MCNC: 0.3 MG/DL (ref 0.1–1.5)
BUN SERPL-MCNC: 9 MG/DL (ref 8–22)
CALCIUM SERPL-MCNC: 9.1 MG/DL (ref 8.5–10.5)
CHLORIDE SERPL-SCNC: 107 MMOL/L (ref 96–112)
CO2 SERPL-SCNC: 22 MMOL/L (ref 20–33)
CREAT SERPL-MCNC: 0.69 MG/DL (ref 0.5–1.4)
EOSINOPHIL # BLD AUTO: 0.08 K/UL (ref 0–0.51)
EOSINOPHIL NFR BLD: 1 % (ref 0–6.9)
ERYTHROCYTE [DISTWIDTH] IN BLOOD BY AUTOMATED COUNT: 47.4 FL (ref 35.9–50)
GLOBULIN SER CALC-MCNC: 2.7 G/DL (ref 1.9–3.5)
GLUCOSE SERPL-MCNC: 172 MG/DL (ref 65–99)
HCT VFR BLD AUTO: 39.2 % (ref 37–47)
HGB BLD-MCNC: 13 G/DL (ref 12–16)
IMM GRANULOCYTES # BLD AUTO: 0.04 K/UL (ref 0–0.11)
IMM GRANULOCYTES NFR BLD AUTO: 0.5 % (ref 0–0.9)
LYMPHOCYTES # BLD AUTO: 1.45 K/UL (ref 1–4.8)
LYMPHOCYTES NFR BLD: 17.7 % (ref 22–41)
MCH RBC QN AUTO: 31.9 PG (ref 27–33)
MCHC RBC AUTO-ENTMCNC: 33.2 G/DL (ref 33.6–35)
MCV RBC AUTO: 96.3 FL (ref 81.4–97.8)
MONOCYTES # BLD AUTO: 0.62 K/UL (ref 0–0.85)
MONOCYTES NFR BLD AUTO: 7.6 % (ref 0–13.4)
NEUTROPHILS # BLD AUTO: 5.95 K/UL (ref 2–7.15)
NEUTROPHILS NFR BLD: 72.6 % (ref 44–72)
NRBC # BLD AUTO: 0 K/UL
NRBC BLD-RTO: 0 /100 WBC
PLATELET # BLD AUTO: 258 K/UL (ref 164–446)
PMV BLD AUTO: 10.9 FL (ref 9–12.9)
POTASSIUM SERPL-SCNC: 3.9 MMOL/L (ref 3.6–5.5)
PROT SERPL-MCNC: 5.9 G/DL (ref 6–8.2)
RBC # BLD AUTO: 4.07 M/UL (ref 4.2–5.4)
SODIUM SERPL-SCNC: 138 MMOL/L (ref 135–145)
WBC # BLD AUTO: 8.2 K/UL (ref 4.8–10.8)

## 2018-06-19 PROCEDURE — 80053 COMPREHEN METABOLIC PANEL: CPT

## 2018-06-19 PROCEDURE — 36415 COLL VENOUS BLD VENIPUNCTURE: CPT

## 2018-06-19 PROCEDURE — 85025 COMPLETE CBC W/AUTO DIFF WBC: CPT

## 2018-06-20 ENCOUNTER — HOSPITAL ENCOUNTER (OUTPATIENT)
Facility: MEDICAL CENTER | Age: 33
End: 2018-06-20
Attending: OBSTETRICS & GYNECOLOGY
Payer: COMMERCIAL

## 2018-06-20 DIAGNOSIS — O10.919 HTN IN PREGNANCY, CHRONIC: ICD-10-CM

## 2018-06-20 LAB
PROT 24H UR-MCNC: 199.5 MG/24 HR (ref 30–150)
PROT 24H UR-MRATE: 7 MG/DL (ref 0–15)
SPECIMEN VOL UR: 2850 ML

## 2018-06-20 PROCEDURE — 81050 URINALYSIS VOLUME MEASURE: CPT

## 2018-06-20 PROCEDURE — 84156 ASSAY OF PROTEIN URINE: CPT

## 2018-06-21 ENCOUNTER — HOSPITAL ENCOUNTER (OUTPATIENT)
Facility: MEDICAL CENTER | Age: 33
End: 2018-06-21
Attending: OBSTETRICS & GYNECOLOGY | Admitting: OBSTETRICS & GYNECOLOGY
Payer: COMMERCIAL

## 2018-06-22 ENCOUNTER — HOSPITAL ENCOUNTER (INPATIENT)
Facility: MEDICAL CENTER | Age: 33
LOS: 1 days | DRG: 781 | End: 2018-06-23
Attending: OBSTETRICS & GYNECOLOGY | Admitting: OBSTETRICS & GYNECOLOGY
Payer: COMMERCIAL

## 2018-06-22 LAB
ALBUMIN SERPL BCP-MCNC: 3.5 G/DL (ref 3.2–4.9)
ALBUMIN/GLOB SERPL: 1.1 G/DL
ALP SERPL-CCNC: 82 U/L (ref 30–99)
ALT SERPL-CCNC: 8 U/L (ref 2–50)
ANION GAP SERPL CALC-SCNC: 8 MMOL/L (ref 0–11.9)
APPEARANCE UR: CLEAR
AST SERPL-CCNC: 16 U/L (ref 12–45)
BASOPHILS # BLD AUTO: 0.6 % (ref 0–1.8)
BASOPHILS # BLD: 0.05 K/UL (ref 0–0.12)
BILIRUB SERPL-MCNC: 0.5 MG/DL (ref 0.1–1.5)
BUN SERPL-MCNC: 9 MG/DL (ref 8–22)
CALCIUM SERPL-MCNC: 9.2 MG/DL (ref 8.5–10.5)
CHLORIDE SERPL-SCNC: 110 MMOL/L (ref 96–112)
CO2 SERPL-SCNC: 22 MMOL/L (ref 20–33)
COLOR UR AUTO: YELLOW
CREAT SERPL-MCNC: 0.59 MG/DL (ref 0.5–1.4)
EOSINOPHIL # BLD AUTO: 0.14 K/UL (ref 0–0.51)
EOSINOPHIL NFR BLD: 1.6 % (ref 0–6.9)
ERYTHROCYTE [DISTWIDTH] IN BLOOD BY AUTOMATED COUNT: 44.5 FL (ref 35.9–50)
GLOBULIN SER CALC-MCNC: 3.1 G/DL (ref 1.9–3.5)
GLUCOSE SERPL-MCNC: 67 MG/DL (ref 65–99)
GLUCOSE UR QL STRIP.AUTO: NEGATIVE MG/DL
HCT VFR BLD AUTO: 41.1 % (ref 37–47)
HGB BLD-MCNC: 14.2 G/DL (ref 12–16)
IMM GRANULOCYTES # BLD AUTO: 0.06 K/UL (ref 0–0.11)
IMM GRANULOCYTES NFR BLD AUTO: 0.7 % (ref 0–0.9)
KETONES UR QL STRIP.AUTO: NEGATIVE MG/DL
LEUKOCYTE ESTERASE UR QL STRIP.AUTO: NEGATIVE
LYMPHOCYTES # BLD AUTO: 1.98 K/UL (ref 1–4.8)
LYMPHOCYTES NFR BLD: 22.2 % (ref 22–41)
MCH RBC QN AUTO: 32.2 PG (ref 27–33)
MCHC RBC AUTO-ENTMCNC: 34.5 G/DL (ref 33.6–35)
MCV RBC AUTO: 93.2 FL (ref 81.4–97.8)
MONOCYTES # BLD AUTO: 0.79 K/UL (ref 0–0.85)
MONOCYTES NFR BLD AUTO: 8.9 % (ref 0–13.4)
NEUTROPHILS # BLD AUTO: 5.9 K/UL (ref 2–7.15)
NEUTROPHILS NFR BLD: 66 % (ref 44–72)
NITRITE UR QL STRIP.AUTO: NEGATIVE
NRBC # BLD AUTO: 0 K/UL
NRBC BLD-RTO: 0 /100 WBC
PH UR STRIP.AUTO: 7 [PH]
PLATELET # BLD AUTO: 283 K/UL (ref 164–446)
PMV BLD AUTO: 10.6 FL (ref 9–12.9)
POTASSIUM SERPL-SCNC: 3.8 MMOL/L (ref 3.6–5.5)
PROT SERPL-MCNC: 6.6 G/DL (ref 6–8.2)
PROT UR QL STRIP: NEGATIVE MG/DL
RBC # BLD AUTO: 4.41 M/UL (ref 4.2–5.4)
RBC UR QL AUTO: NEGATIVE
SODIUM SERPL-SCNC: 140 MMOL/L (ref 135–145)
SP GR UR: <=1.005
URATE SERPL-MCNC: 4.1 MG/DL (ref 1.9–8.2)
WBC # BLD AUTO: 8.9 K/UL (ref 4.8–10.8)

## 2018-06-22 PROCEDURE — 59025 FETAL NON-STRESS TEST: CPT | Performed by: OBSTETRICS & GYNECOLOGY

## 2018-06-22 PROCEDURE — 700101 HCHG RX REV CODE 250: Performed by: OBSTETRICS & GYNECOLOGY

## 2018-06-22 PROCEDURE — A9270 NON-COVERED ITEM OR SERVICE: HCPCS | Performed by: OBSTETRICS & GYNECOLOGY

## 2018-06-22 PROCEDURE — 700102 HCHG RX REV CODE 250 W/ 637 OVERRIDE(OP): Performed by: OBSTETRICS & GYNECOLOGY

## 2018-06-22 PROCEDURE — 770002 HCHG ROOM/CARE - OB PRIVATE (112)

## 2018-06-22 PROCEDURE — 84550 ASSAY OF BLOOD/URIC ACID: CPT

## 2018-06-22 PROCEDURE — 700111 HCHG RX REV CODE 636 W/ 250 OVERRIDE (IP): Performed by: OBSTETRICS & GYNECOLOGY

## 2018-06-22 PROCEDURE — 87653 STREP B DNA AMP PROBE: CPT

## 2018-06-22 PROCEDURE — 80053 COMPREHEN METABOLIC PANEL: CPT

## 2018-06-22 PROCEDURE — 81002 URINALYSIS NONAUTO W/O SCOPE: CPT

## 2018-06-22 PROCEDURE — 85025 COMPLETE CBC W/AUTO DIFF WBC: CPT

## 2018-06-22 RX ORDER — INSULIN GLARGINE 100 [IU]/ML
18 INJECTION, SOLUTION SUBCUTANEOUS NIGHTLY
COMMUNITY

## 2018-06-22 RX ORDER — LABETALOL 100 MG/1
100 TABLET, FILM COATED ORAL TWICE DAILY
Status: DISCONTINUED | OUTPATIENT
Start: 2018-06-22 | End: 2018-06-23 | Stop reason: HOSPADM

## 2018-06-22 RX ORDER — HYDRALAZINE HYDROCHLORIDE 20 MG/ML
5-10 INJECTION INTRAMUSCULAR; INTRAVENOUS PRN
Status: DISCONTINUED | OUTPATIENT
Start: 2018-06-22 | End: 2018-06-23 | Stop reason: HOSPADM

## 2018-06-22 RX ORDER — LABETALOL 100 MG/1
100 TABLET, FILM COATED ORAL TWICE DAILY
Status: DISCONTINUED | OUTPATIENT
Start: 2018-06-22 | End: 2018-06-22

## 2018-06-22 RX ORDER — LABETALOL HYDROCHLORIDE 5 MG/ML
20-80 INJECTION, SOLUTION INTRAVENOUS PRN
Status: DISCONTINUED | OUTPATIENT
Start: 2018-06-22 | End: 2018-06-23 | Stop reason: HOSPADM

## 2018-06-22 RX ORDER — BETAMETHASONE SODIUM PHOSPHATE AND BETAMETHASONE ACETATE 3; 3 MG/ML; MG/ML
12 INJECTION, SUSPENSION INTRA-ARTICULAR; INTRALESIONAL; INTRAMUSCULAR; SOFT TISSUE EVERY 24 HOURS
Status: COMPLETED | OUTPATIENT
Start: 2018-06-22 | End: 2018-06-23

## 2018-06-22 RX ORDER — METHYLDOPA 500 MG/1
500 TABLET, FILM COATED ORAL TWICE DAILY
Status: DISCONTINUED | OUTPATIENT
Start: 2018-06-22 | End: 2018-06-23 | Stop reason: HOSPADM

## 2018-06-22 RX ORDER — METHYLDOPA 250 MG/1
250 TABLET, FILM COATED ORAL TWICE DAILY
Status: DISCONTINUED | OUTPATIENT
Start: 2018-06-22 | End: 2018-06-22

## 2018-06-22 RX ADMIN — LABETALOL HYDROCHLORIDE 40 MG: 5 INJECTION, SOLUTION INTRAVENOUS at 11:38

## 2018-06-22 RX ADMIN — LABETALOL HYDROCHLORIDE 100 MG: 100 TABLET, FILM COATED ORAL at 20:57

## 2018-06-22 RX ADMIN — METHYLDOPA 500 MG: 500 TABLET ORAL at 18:13

## 2018-06-22 RX ADMIN — LABETALOL HYDROCHLORIDE 100 MG: 100 TABLET, FILM COATED ORAL at 15:09

## 2018-06-22 RX ADMIN — LABETALOL HYDROCHLORIDE 20 MG: 5 INJECTION, SOLUTION INTRAVENOUS at 11:26

## 2018-06-22 RX ADMIN — BETAMETHASONE SODIUM PHOSPHATE AND BETAMETHASONE ACETATE 12 MG: 3; 3 INJECTION, SUSPENSION INTRA-ARTICULAR; INTRALESIONAL; INTRAMUSCULAR at 12:29

## 2018-06-22 ASSESSMENT — LIFESTYLE VARIABLES: EVER_SMOKED: NEVER

## 2018-06-22 NOTE — H&P
History and Physical      Fatou Thomas is a 32 y.o. year old female  at 32w2d who presents for routine NST monitoring, and BPs noted to be elevated 140s-170s/90s-110s.  Denies headache, visual changes, RUQ pain, or edema.  Hx of chtn, on aldomet 250 mg po bid and baby aspirin. Also type I DM, on Lantus and sliding scale.      Subjective:   negative  For CTXS.   negative Feels pain   negative for LOF  negative for vaginal bleeding.   positive for fetal movement    ROS: Pertinent items are noted in HPI.    Past Medical History:   Diagnosis Date   • Diabetes mellitus of mother, complicating pregnancy, childbirth, or the puerperium, unspecified as to episode of care(648.00) 2015   • Encounter for long-term (current) use of insulin ([facility]) 2015   • Type II or unspecified type diabetes mellitus without mention of complication, not stated as uncontrolled      Past Surgical History:   Procedure Laterality Date   • PRIMARY C SECTION  2016    Procedure: PRIMARY C SECTION;  Surgeon: Miriam Quinones M.D.;  Location: LABOR AND DELIVERY;  Service:    • ARTHROSCOPY, KNEE     • OPEN REDUCTION       OB History    Para Term  AB Living   2 1 0 1   1   SAB TAB Ectopic Molar Multiple Live Births             1      # Outcome Date GA Lbr Doron/2nd Weight Sex Delivery Anes PTL Lv   2 Current            1  16 35w4d  3.475 kg (7 lb 10.6 oz) F CS-Unspec  N SILVA      Birth Comments: severe preclampsia, failed IOL        Social History     Social History   • Marital status:      Spouse name: N/A   • Number of children: N/A   • Years of education: N/A     Occupational History   • Not on file.     Social History Main Topics   • Smoking status: Never Smoker   • Smokeless tobacco: Never Used   • Alcohol use No   • Drug use: No   • Sexual activity: Yes     Partners: Male     Other Topics Concern   • Not on file     Social History Narrative   • No narrative on file     Allergies:  Patient has no known allergies.  No current facility-administered medications for this encounter.     Prenatal care with [facility] starting at 13 wks with following problems:  Patient Active Problem List    Diagnosis Date Noted   • Previous  section 2018     Priority: High   • HTN in pregnancy, chronic 2015     Priority: High   • Essential hypertension 2018   • Pregnancy and insulin-dependent diabetes mellitus in second trimester (HCC) 2018   • Hx of preeclampsia, prior pregnancy, currently pregnant, first trimester 01/10/2018   • High-risk pregnancy, second trimester 2015   • Encounter for long-term (current) use of insulin ([facility]) 2015   • Type 1 diabetes mellitus ([facility]) 2015               Objective:      Blood pressure 136/97, pulse 97, last menstrual period 2017.    General:   no acute distress   Skin:   normal   HEENT:  PERRLA   Lungs:   CTA bilateral   Heart:   S1, S2 normal, no murmur, click, rub or gallop, regular rate and rhythm, brisk carotid upstroke without bruits, peripheral pulses very brisk, chest is clear without rales or wheezing, no pedal edema, no JVD, no hepatosplenomegaly   Abdomen:   gravid, NT   EFW:  3000 grams   Pelvis:  adequate with gynecoid pelvis   FHT:  120s BPM, moderate variability, + accels   Uterine Size: S=D   Presentations: Cephalic   Cervix: deferred                              Lab Review  Lab:   Blood type: O     Recent Results (from the past 5880 hour(s))   THINPREP RFLX HPV ASCUS W/CTNG    Collection Time: 01/10/18  1:56 PM   Result Value Ref Range    Cytology Reg See Path Report     Source Cervical     C. trachomatis by PCR Negative Negative    N. gonorrhoeae by PCR Negative Negative   POCT US - In Clinic OB Scan    Collection Time: 01/10/18  2:39 PM   Result Value Ref Range    In Clinic OB Scan     PRENATAL PANEL 3+HIV+UACXI    Collection Time: 18  8:14 AM   Result Value Ref Range    Color Yellow     Character Clear      Specific Gravity 1.013 <1.035    Ph 6.5 5.0 - 8.0    Glucose >=1000 (A) Negative mg/dL    Ketones Negative Negative mg/dL    Protein Negative Negative mg/dL    Bilirubin Negative Negative    Urobilinogen, Urine 0.2 Negative    Nitrite Negative Negative    Leukocyte Esterase Negative Negative    Occult Blood Negative Negative    Micro Urine Req see below     Culture Indicated No UA Culture    WBC 9.2 4.8 - 10.8 K/uL    RBC 4.28 4.20 - 5.40 M/uL    Hemoglobin 13.2 12.0 - 16.0 g/dL    Hematocrit 40.5 37.0 - 47.0 %    MCV 94.6 81.4 - 97.8 fL    MCH 30.8 27.0 - 33.0 pg    MCHC 32.6 (L) 33.6 - 35.0 g/dL    RDW 43.8 35.9 - 50.0 fL    Platelet Count 242 164 - 446 K/uL    MPV 10.6 9.0 - 12.9 fL    Neutrophils-Polys 75.70 (H) 44.00 - 72.00 %    Lymphocytes 15.90 (L) 22.00 - 41.00 %    Monocytes 5.50 0.00 - 13.40 %    Eosinophils 1.60 0.00 - 6.90 %    Basophils 1.00 0.00 - 1.80 %    Immature Granulocytes 0.30 0.00 - 0.90 %    Nucleated RBC 0.00 /100 WBC    Neutrophils (Absolute) 6.96 2.00 - 7.15 K/uL    Lymphs (Absolute) 1.46 1.00 - 4.80 K/uL    Monos (Absolute) 0.51 0.00 - 0.85 K/uL    Eos (Absolute) 0.15 0.00 - 0.51 K/uL    Baso (Absolute) 0.09 0.00 - 0.12 K/uL    Immature Granulocytes (abs) 0.03 0.00 - 0.11 K/uL    NRBC (Absolute) 0.00 K/uL    Rubella IgG Antibody 95.20 IU/mL    Syphilis, Treponemal Qual Non Reactive Non Reactive    Hepatitis B Surface Antigen Negative Negative   COMP METABOLIC PANEL    Collection Time: 01/23/18  8:14 AM   Result Value Ref Range    Sodium 134 (L) 135 - 145 mmol/L    Potassium 4.4 3.6 - 5.5 mmol/L    Chloride 103 96 - 112 mmol/L    Co2 25 20 - 33 mmol/L    Anion Gap 6.0 0.0 - 11.9    Glucose 278 (H) 65 - 99 mg/dL    Bun 11 8 - 22 mg/dL    Creatinine 0.69 0.50 - 1.40 mg/dL    Calcium 9.3 8.5 - 10.5 mg/dL    AST(SGOT) 11 (L) 12 - 45 U/L    ALT(SGPT) 8 2 - 50 U/L    Alkaline Phosphatase 46 30 - 99 U/L    Total Bilirubin 0.6 0.1 - 1.5 mg/dL    Albumin 3.8 3.2 - 4.9 g/dL    Total Protein 6.7 6.0  - 8.2 g/dL    Globulin 2.9 1.9 - 3.5 g/dL    A-G Ratio 1.3 g/dL   HIV AG/AB COMBO ASSAY SCREENING    Collection Time: 01/23/18  8:14 AM   Result Value Ref Range    HIV Ag/Ab Combo Assay Non Reactive Non Reactive   OP PRENATAL PANEL-BLOOD BANK    Collection Time: 01/23/18  8:14 AM   Result Value Ref Range    ABO Grouping Only O     Rh Grouping Only POS     Antibody Screen Scrn NEG    ESTIMATED GFR    Collection Time: 01/23/18  8:14 AM   Result Value Ref Range    GFR If African American >60 >60 mL/min/1.73 m 2    GFR If Non African American >60 >60 mL/min/1.73 m 2   URINETOTAL PROTEIN 24 HR    Collection Time: 01/24/18  9:00 AM   Result Value Ref Range    Total Volume, Urine 3025 mL    Total Protein, Urine <4.0 0.0 - 15.0 mg/dL    Total Protein, 24 Hour Urine see below 30.0 - 150.0 mg/24 Hr   SEQUENTIAL 1    Collection Time: 02/09/18  7:49 AM   Result Value Ref Range    Maternal Weight 127 lbs    Interpretation See Note     Race White     Multiple Pregnancy One     M[age] MANDA 33.0 yr    Hcg Value 63874 IU/L    Hcg Mom 0.58     Gestational Age 13.57 weeks    Nuchal Translucency 1.50 mm    CRL Scan 6.92 cm    ZACHARIAH-A Value 1806 mIU/L    ZACHARIAH-A MoM 0.51     Sonographer ID: [id number]     Sonographer ID: [id number] [email or web address]     Ultrasound Date [id number]     NT MoM 0.93     Best date to draw Sample 2 by: [id number]     ERR Mat Scrn, Seq. Spec 1 See Note    HEMOGLOBIN A1C    Collection Time: 02/15/18 12:16 PM   Result Value Ref Range    Glycohemoglobin 6.2 (H) 0.0 - 5.6 %    Est Avg Glucose 131 mg/dL   AFP MATERNAL SERUM ALPHA-FETOPROTEIN    Collection Time: 03/05/18  9:27 AM   Result Value Ref Range    AFP Value -Eia 71 ng/mL    AFP MOM Value 1.76     Interpretation Screen Neg     M[age] MANDA 31[age]1 yr    Maternal Weight 127.0 lbs.     Gest. Age on Collection Date 16 wks, 5 days     Gestational Age Based On Ultrasound     Multiple Pregnancy Quick     Race Nonblack     Insulin Dependent Diabetes Yes     Smoking No     Family  Hx NTD No     Specimen See Note    HEMOGLOBIN A1C    Collection Time: 03/23/18  9:20 AM   Result Value Ref Range    Glycohemoglobin 6.1 (H) 0.0 - 5.6 %    Est Avg Glucose 128 mg/dL   HEMOGLOBIN A1C    Collection Time: 04/18/18  1:53 PM   Result Value Ref Range    Glycohemoglobin 6.6 (H) 0.0 - 5.6 %    Est Avg Glucose 143 mg/dL   HEMOGLOBIN A1C    Collection Time: 05/17/18  1:30 PM   Result Value Ref Range    Glycohemoglobin 6.6 (H) 0.0 - 5.6 %    Est Avg Glucose 143 mg/dL   HEMOGLOBIN A1C    Collection Time: 06/13/18 12:51 PM   Result Value Ref Range    Glycohemoglobin 6.2 (H) 0.0 - 5.6 %    Est Avg Glucose 131 mg/dL   COMP METABOLIC PANEL    Collection Time: 06/19/18  4:00 PM   Result Value Ref Range    Sodium 138 135 - 145 mmol/L    Potassium 3.9 3.6 - 5.5 mmol/L    Chloride 107 96 - 112 mmol/L    Co2 22 20 - 33 mmol/L    Anion Gap 9.0 0.0 - 11.9    Glucose 172 (H) 65 - 99 mg/dL    Bun 9 8 - 22 mg/dL    Creatinine 0.69 0.50 - 1.40 mg/dL    Calcium 9.1 8.5 - 10.5 mg/dL    AST(SGOT) 13 12 - 45 U/L    ALT(SGPT) 10 2 - 50 U/L    Alkaline Phosphatase 88 30 - 99 U/L    Total Bilirubin 0.3 0.1 - 1.5 mg/dL    Albumin 3.2 3.2 - 4.9 g/dL    Total Protein 5.9 (L) 6.0 - 8.2 g/dL    Globulin 2.7 1.9 - 3.5 g/dL    A-G Ratio 1.2 g/dL   CBC WITH DIFFERENTIAL    Collection Time: 06/19/18  4:00 PM   Result Value Ref Range    WBC 8.2 4.8 - 10.8 K/uL    RBC 4.07 (L) 4.20 - 5.40 M/uL    Hemoglobin 13.0 12.0 - 16.0 g/dL    Hematocrit 39.2 37.0 - 47.0 %    MCV 96.3 81.4 - 97.8 fL    MCH 31.9 27.0 - 33.0 pg    MCHC 33.2 (L) 33.6 - 35.0 g/dL    RDW 47.4 35.9 - 50.0 fL    Platelet Count 258 164 - 446 K/uL    MPV 10.9 9.0 - 12.9 fL    Neutrophils-Polys 72.60 (H) 44.00 - 72.00 %    Lymphocytes 17.70 (L) 22.00 - 41.00 %    Monocytes 7.60 0.00 - 13.40 %    Eosinophils 1.00 0.00 - 6.90 %    Basophils 0.60 0.00 - 1.80 %    Immature Granulocytes 0.50 0.00 - 0.90 %    Nucleated RBC 0.00 /100 WBC    Neutrophils (Absolute) 5.95 2.00 - 7.15 K/uL     Lymphs (Absolute) 1.45 1.00 - 4.80 K/uL    Monos (Absolute) 0.62 0.00 - 0.85 K/uL    Eos (Absolute) 0.08 0.00 - 0.51 K/uL    Baso (Absolute) 0.05 0.00 - 0.12 K/uL    Immature Granulocytes (abs) 0.04 0.00 - 0.11 K/uL    NRBC (Absolute) 0.00 K/uL   ESTIMATED GFR    Collection Time: 18  4:00 PM   Result Value Ref Range    GFR If African American >60 >60 mL/min/1.73 m 2    GFR If Non African American >60 >60 mL/min/1.73 m 2   URINETOTAL PROTEIN 24 HR    Collection Time: 18  3:00 PM   Result Value Ref Range    Total Protein, Urine 7.0 0.0 - 15.0 mg/dL    Total Volume, Urine 2850 mL    Total Protein, 24 Hour Urine 199.5 (H) 30.0 - 150.0 mg/24 Hr   POC UA    Collection Time: 18 10:17 AM   Result Value Ref Range    POC Color Yellow     POC Appearance Clear     POC Glucose Negative Negative mg/dL    POC Ketones Negative Negative mg/dL    POC Specific Gravity <=1.005 (A) 1.005 - 1.030    POC Blood Negative Negative    POC Urine PH 7.0 5.0 - 8.0    POC Protein Negative Negative mg/dL    POC Nitrites Negative Negative    POC Leukocyte Esterase Negative Negative        Assessment:   Fatou Thomas at 32w2d  Labor status: Not in labor.  Chronic hypertension, r/o superimposed pre-eclampsia  Type 1 DM  Obstetrical history significant for   Patient Active Problem List    Diagnosis Date Noted   • Previous  section 2018     Priority: High   • HTN in pregnancy, chronic 2015     Priority: High   • Essential hypertension 2018   • Pregnancy and insulin-dependent diabetes mellitus in second trimester (HCC) 2018   • Hx of preeclampsia, prior pregnancy, currently pregnant, first trimester 01/10/2018   • High-risk pregnancy, second trimester 2015   • Encounter for long-term (current) use of insulin ([facility]) 2015   • Type 1 diabetes mellitus ([facility]) 2015   .      Plan:     Admit to L&D  GBS unknown  Steroids  Hypertensive protocol  Magnesium Sulfate if continued severe  range BPs  NPO - blood glucoses q 1hour with sliding scale or D5LR prn  Hx of previous C/S, desires repeat.

## 2018-06-22 NOTE — PROGRESS NOTES
MFM NOTE:  32W2D  Type 1 diabetic that is well known to me with this pregnancy and as well as the last pregnancy.  She has had mild chronic hypertension with this pregnancy and    Has been on Aldomet 250 mg BID.  She was sent over from her prenatal visit today with very high blood pressures 180-190/ range and did require IV labetalol to get her BP's down to the 130/80-90 range.  She took her Aldomet this morning.  Last pregnancy she was admitted for pre-eclampsia and eventually delivered around 35 weeks an infant that was LGA at 8 lbs.  I last saw her in the office 3 days ago but do not have the chart or report with me. But the growth of the fetus had dripped from the 33% on the scan 4 weeks ago to the 16%.  The LEYDI and dopplers were normal .  Her blood pressure was in the 130-140/90 range at my office but certainly not what the initial BP's were here today.  NST was reactive at my office and is category one here at [facility].  Her PIH labs are all normal.  I think this is an exacerbation of her chronic hypertension currently.    Recommend:  Start labetalol 100 mg BID now and I've increased her Aldomet to 500 mg BID.  She wants to return to work but I think she needs to be of work until delivery at this point.

## 2018-06-22 NOTE — PROGRESS NOTES
MANDA  8/15 EGA 32.2    +FM, denies LOF, UC or VB.    PT presenting for scheduled NST. TOCO and FM placed. BP elevated per doc flow, UA dipped and serial BP's cycling     1100 Report to Edna, per MD she will see PT for admission orders. Per Edna, okay to allow PT to manage own blood sugars and insulin administration.     1115 IV placed and labs ordered.     1130 Edna in department. Orders for Magnesium if BP's continue to be elevated.     1330 [place] at BS, new orders placed.     1530 PT resting, denies needs.     1810 RN at BS for meds,per PT her blood sugar is now 200.    1840 Update to Edna, we discussed PT stating that he blood sugars have increased to 200 since approximately 1530.  She has been treating self.

## 2018-06-23 VITALS
HEART RATE: 80 BPM | SYSTOLIC BLOOD PRESSURE: 118 MMHG | HEIGHT: 61 IN | TEMPERATURE: 97.7 F | DIASTOLIC BLOOD PRESSURE: 74 MMHG | BODY MASS INDEX: 27.94 KG/M2 | WEIGHT: 148 LBS | RESPIRATION RATE: 17 BRPM

## 2018-06-23 LAB
GP B STREP DNA SPEC QL NAA+PROBE: POSITIVE
PROT 24H UR-MCNC: 192 MG/24 HR (ref 30–150)
PROT 24H UR-MRATE: 6 MG/DL (ref 0–15)
SPECIMEN VOL UR: 3200 ML

## 2018-06-23 PROCEDURE — 84156 ASSAY OF PROTEIN URINE: CPT

## 2018-06-23 PROCEDURE — 700111 HCHG RX REV CODE 636 W/ 250 OVERRIDE (IP): Performed by: OBSTETRICS & GYNECOLOGY

## 2018-06-23 PROCEDURE — A9270 NON-COVERED ITEM OR SERVICE: HCPCS | Performed by: OBSTETRICS & GYNECOLOGY

## 2018-06-23 PROCEDURE — 81050 URINALYSIS VOLUME MEASURE: CPT

## 2018-06-23 PROCEDURE — 700102 HCHG RX REV CODE 250 W/ 637 OVERRIDE(OP): Performed by: OBSTETRICS & GYNECOLOGY

## 2018-06-23 RX ADMIN — BETAMETHASONE SODIUM PHOSPHATE AND BETAMETHASONE ACETATE 12 MG: 3; 3 INJECTION, SUSPENSION INTRA-ARTICULAR; INTRALESIONAL; INTRAMUSCULAR at 12:23

## 2018-06-23 RX ADMIN — LABETALOL HYDROCHLORIDE 100 MG: 100 TABLET, FILM COATED ORAL at 09:09

## 2018-06-23 NOTE — PROGRESS NOTES
MFM NOTE:  32w3d  Chronic HTN  Type 1 Diabetes  BP's have settled down nicely on Labetalol 100 mg po BID  In fact Her Aldomet dose had to be withheld because her BP was 110/80.  FHR is reactive and Category one.  Her labs are C/W chronic hypertension and not superimposed pre-eclampsia.  Plan:  I told her she could be discharged to bedrest at home but could not return to work until after the delivery.  I told her to discontinue the aldomet for now and just take Labetalol.  I wrote prescription for labetalol 100 mg BID and a note to be off work.  Follow up with Dr. Quinones this next week and with me the following week.    35 minutes

## 2018-06-23 NOTE — CARE PLAN
Problem: Safety  Goal: Will remain free from injury  Outcome: PROGRESSING AS EXPECTED  Pt remains free from injury, safety plan in place, patient aware of monitor cords.     Problem: Infection  Goal: Will remain free from infection  Outcome: PROGRESSING AS EXPECTED  Pt remains free from signs/symptoms of infection, pt afebrile.

## 2018-06-23 NOTE — PROGRESS NOTES
Assumed care of PT. PT requesting to be DC'd today.  0800 Joni in department, discussed POC. Per Joni we will CTM serial BP's and Bria will call Gissel after 2nd steroid given with update.  1215 Report to Bria, BP's WNL. Will call Gissel.  0912 EFM and TOCO applied.  0945 Bria reviewed fetal tracing.   1220 Grace in department.  Per MD okay to DC with labor precautions and BP instructions.   1240 PT to return for signs or increased BP as followed on sheet (swelling, epigastric pain, blurred vision or HA). Also verbalized returning for decreased FM. SROM, VB or UC's

## 2018-06-23 NOTE — PROGRESS NOTES
1900: Report received from KANG Shultz.     1920: Assessment completed, POC discussed and all questions and concerns addressed. Pt has no needs at this time. Pt declines LOF, VB, UC's, and states +FM. Pt declines epigastric pain, headaches, blurry vision, and swelling.     2206: TOCO/US applied    2250: NST obtained.     0610: Dr. Quinones updated with pt's BP's, orders received to hold 500 mg of aldomet at this time.     0700: Report given to KANG Shultz RN.

## 2018-06-26 ENCOUNTER — HOSPITAL ENCOUNTER (INPATIENT)
Facility: MEDICAL CENTER | Age: 33
LOS: 11 days | End: 2018-07-10
Attending: OBSTETRICS & GYNECOLOGY | Admitting: OBSTETRICS & GYNECOLOGY
Payer: COMMERCIAL

## 2018-06-26 ENCOUNTER — ROUTINE PRENATAL (OUTPATIENT)
Dept: OBGYN | Facility: CLINIC | Age: 33
End: 2018-06-26
Payer: COMMERCIAL

## 2018-06-26 VITALS — SYSTOLIC BLOOD PRESSURE: 168 MMHG | DIASTOLIC BLOOD PRESSURE: 106 MMHG

## 2018-06-26 DIAGNOSIS — I10 ESSENTIAL HYPERTENSION: ICD-10-CM

## 2018-06-26 DIAGNOSIS — Z98.891 PREVIOUS CESAREAN SECTION: ICD-10-CM

## 2018-06-26 DIAGNOSIS — O10.919 HTN IN PREGNANCY, CHRONIC: ICD-10-CM

## 2018-06-26 DIAGNOSIS — E10.9 TYPE 1 DIABETES MELLITUS WITHOUT COMPLICATION (HCC): ICD-10-CM

## 2018-06-26 DIAGNOSIS — Z98.891 S/P CESAREAN SECTION: ICD-10-CM

## 2018-06-26 LAB
APPEARANCE UR: CLEAR
COLOR UR AUTO: YELLOW
GLUCOSE UR QL STRIP.AUTO: 250 MG/DL
KETONES UR QL STRIP.AUTO: 80 MG/DL
LEUKOCYTE ESTERASE UR QL STRIP.AUTO: NEGATIVE
NITRITE UR QL STRIP.AUTO: NEGATIVE
NST ACOUSTIC STIMULATION: NORMAL
NST ACTION NECESSARY: NORMAL
NST ASSESSMENT: REACTIVE
NST BASELINE: 110
NST INDICATIONS: NORMAL
NST OTHER DATA: NORMAL
NST READ BY: NORMAL
NST RETURN: NORMAL
NST UTERINE ACTIVITY: NORMAL
PH UR STRIP.AUTO: 7 [PH]
PROT UR QL STRIP: NEGATIVE MG/DL
RBC UR QL AUTO: NEGATIVE
SP GR UR: 1.02

## 2018-06-26 PROCEDURE — 81002 URINALYSIS NONAUTO W/O SCOPE: CPT

## 2018-06-26 PROCEDURE — 302790 HCHG STAT ANTEPARTUM CARE, DAILY

## 2018-06-26 PROCEDURE — 59025 FETAL NON-STRESS TEST: CPT | Performed by: OBSTETRICS & GYNECOLOGY

## 2018-06-26 RX ORDER — LABETALOL 200 MG/1
200 TABLET, FILM COATED ORAL 2 TIMES DAILY
Qty: 60 TAB | Refills: 1 | Status: SHIPPED | OUTPATIENT
Start: 2018-06-26

## 2018-06-26 NOTE — PROGRESS NOTES
NST  Patient with hypertension, BP 150s-180/  Patient very anxious and crying during NST today due to her current issues  On labetalol 100mg BID & restarted Aldomet 250mg BID this morning  In hospital had Aldomet d/c'ed and has noticed her BP increasing so patient restarted Aldomet this morning  Urinalysis normal today, no protein  No headache, RUQ pain, visual changes.  On bed rest  Patient will increase her labetalol to 200mg BID, first dose now  If BP still elevated she will go to L&D

## 2018-06-27 LAB
ALBUMIN SERPL BCP-MCNC: 2.9 G/DL (ref 3.2–4.9)
ALBUMIN/GLOB SERPL: 1 G/DL
ALP SERPL-CCNC: 57 U/L (ref 30–99)
ALT SERPL-CCNC: 10 U/L (ref 2–50)
ANION GAP SERPL CALC-SCNC: 7 MMOL/L (ref 0–11.9)
AST SERPL-CCNC: 15 U/L (ref 12–45)
BASOPHILS # BLD AUTO: 0.7 % (ref 0–1.8)
BASOPHILS # BLD: 0.07 K/UL (ref 0–0.12)
BILIRUB SERPL-MCNC: 0.4 MG/DL (ref 0.1–1.5)
BUN SERPL-MCNC: 14 MG/DL (ref 8–22)
CALCIUM SERPL-MCNC: 9 MG/DL (ref 8.5–10.5)
CHLORIDE SERPL-SCNC: 105 MMOL/L (ref 96–112)
CO2 SERPL-SCNC: 23 MMOL/L (ref 20–33)
CREAT SERPL-MCNC: 0.68 MG/DL (ref 0.5–1.4)
EOSINOPHIL # BLD AUTO: 0.13 K/UL (ref 0–0.51)
EOSINOPHIL NFR BLD: 1.4 % (ref 0–6.9)
ERYTHROCYTE [DISTWIDTH] IN BLOOD BY AUTOMATED COUNT: 47.9 FL (ref 35.9–50)
GLOBULIN SER CALC-MCNC: 2.9 G/DL (ref 1.9–3.5)
GLUCOSE SERPL-MCNC: 145 MG/DL (ref 65–99)
HCT VFR BLD AUTO: 38.3 % (ref 37–47)
HGB BLD-MCNC: 12.8 G/DL (ref 12–16)
IMM GRANULOCYTES # BLD AUTO: 0.23 K/UL (ref 0–0.11)
IMM GRANULOCYTES NFR BLD AUTO: 2.4 % (ref 0–0.9)
LYMPHOCYTES # BLD AUTO: 1.8 K/UL (ref 1–4.8)
LYMPHOCYTES NFR BLD: 19.1 % (ref 22–41)
MCH RBC QN AUTO: 32.4 PG (ref 27–33)
MCHC RBC AUTO-ENTMCNC: 33.4 G/DL (ref 33.6–35)
MCV RBC AUTO: 97 FL (ref 81.4–97.8)
MONOCYTES # BLD AUTO: 0.88 K/UL (ref 0–0.85)
MONOCYTES NFR BLD AUTO: 9.3 % (ref 0–13.4)
NEUTROPHILS # BLD AUTO: 6.32 K/UL (ref 2–7.15)
NEUTROPHILS NFR BLD: 67.1 % (ref 44–72)
NRBC # BLD AUTO: 0 K/UL
NRBC BLD-RTO: 0 /100 WBC
PLATELET # BLD AUTO: 306 K/UL (ref 164–446)
PMV BLD AUTO: 10.8 FL (ref 9–12.9)
POTASSIUM SERPL-SCNC: 4.3 MMOL/L (ref 3.6–5.5)
PROT SERPL-MCNC: 5.8 G/DL (ref 6–8.2)
RBC # BLD AUTO: 3.95 M/UL (ref 4.2–5.4)
SODIUM SERPL-SCNC: 135 MMOL/L (ref 135–145)
WBC # BLD AUTO: 9.4 K/UL (ref 4.8–10.8)

## 2018-06-27 PROCEDURE — 36415 COLL VENOUS BLD VENIPUNCTURE: CPT

## 2018-06-27 PROCEDURE — 302790 HCHG STAT ANTEPARTUM CARE, DAILY

## 2018-06-27 PROCEDURE — 80053 COMPREHEN METABOLIC PANEL: CPT

## 2018-06-27 PROCEDURE — 85025 COMPLETE CBC W/AUTO DIFF WBC: CPT

## 2018-06-27 PROCEDURE — 59025 FETAL NON-STRESS TEST: CPT | Performed by: OBSTETRICS & GYNECOLOGY

## 2018-06-27 PROCEDURE — A9270 NON-COVERED ITEM OR SERVICE: HCPCS | Performed by: OBSTETRICS & GYNECOLOGY

## 2018-06-27 PROCEDURE — 700102 HCHG RX REV CODE 250 W/ 637 OVERRIDE(OP): Performed by: OBSTETRICS & GYNECOLOGY

## 2018-06-27 RX ORDER — LABETALOL 100 MG/1
200 TABLET, FILM COATED ORAL TWICE DAILY
Status: DISCONTINUED | OUTPATIENT
Start: 2018-06-27 | End: 2018-06-27

## 2018-06-27 RX ORDER — HYDRALAZINE HYDROCHLORIDE 20 MG/ML
5-10 INJECTION INTRAMUSCULAR; INTRAVENOUS PRN
Status: DISCONTINUED | OUTPATIENT
Start: 2018-06-27 | End: 2018-07-07

## 2018-06-27 RX ORDER — LABETALOL HYDROCHLORIDE 5 MG/ML
20-40 INJECTION, SOLUTION INTRAVENOUS PRN
Status: DISCONTINUED | OUTPATIENT
Start: 2018-06-27 | End: 2018-07-07

## 2018-06-27 RX ORDER — METHYLDOPA 250 MG/1
250 TABLET, FILM COATED ORAL EVERY 8 HOURS
Status: DISCONTINUED | OUTPATIENT
Start: 2018-06-27 | End: 2018-06-28

## 2018-06-27 RX ORDER — LABETALOL 100 MG/1
200 TABLET, FILM COATED ORAL EVERY 8 HOURS
Status: DISCONTINUED | OUTPATIENT
Start: 2018-06-27 | End: 2018-07-04

## 2018-06-27 RX ADMIN — METHYLDOPA 250 MG: 250 TABLET, FILM COATED ORAL at 05:56

## 2018-06-27 RX ADMIN — METHYLDOPA 250 MG: 250 TABLET, FILM COATED ORAL at 15:19

## 2018-06-27 RX ADMIN — METHYLDOPA 250 MG: 250 TABLET, FILM COATED ORAL at 21:58

## 2018-06-27 RX ADMIN — LABETALOL HYDROCHLORIDE 200 MG: 100 TABLET, FILM COATED ORAL at 08:49

## 2018-06-27 RX ADMIN — LABETALOL HYDROCHLORIDE 200 MG: 100 TABLET, FILM COATED ORAL at 16:54

## 2018-06-27 ASSESSMENT — PATIENT HEALTH QUESTIONNAIRE - PHQ9
2. FEELING DOWN, DEPRESSED, IRRITABLE, OR HOPELESS: NOT AT ALL
SUM OF ALL RESPONSES TO PHQ9 QUESTIONS 1 AND 2: 0
1. LITTLE INTEREST OR PLEASURE IN DOING THINGS: NOT AT ALL

## 2018-06-27 ASSESSMENT — PAIN SCALES - GENERAL
PAINLEVEL_OUTOF10: 0

## 2018-06-27 NOTE — PROGRESS NOTES
0700 received report from KANG Kay RN, assumed care, POC discussed, all questions answered. MD aware of pt's BP, will continue with pt taking labetalol 200 mg BID as she has been at home and to allow pt 2200cal diabetic tray. Pt reports positive fetal movement, denies leaking of vagina fluid or blood and states she feels occasional contractions that are not painful and pt states this has been her normal for several weeks. Pt denies HA, blurry vision or epigastric pain. Pt encouraged to call RN for any needs, wants, desires or concerns. Will continue with POC.  0913 Dr. Elmer Durant in department, POC is to watch serial BPs through the morning and reevaluate around noon. Will continue with POC.  0944 RN at bedside, EFM and TOCO applied, assessment complete, pt denies needs. Encouraged to call RN prn. Pt reports positive fetal movement.   1225 Dr. Quinones at bedside to discuss POC with pt. Orders received to modify 20mg Labtealol BOD to 200mg TID (q 8 hours), and to keep patient over night to regulate blood pressures and reevaluate in the morning. New labs ordered by MD, and RN can take VS q4 hours at this time. Will continue with POC.  1900 report given to NOC RN, assumed care, POC discussed VSS. All questions answered.

## 2018-06-27 NOTE — PROGRESS NOTES
ANTEPARTUM PROGRESS NOTE overnight observation note;    Fatou Thomas is a 32 y.o. female  at 32w6d.  Patient currently being treated for gestational hypertension with home blood pressures elevated systolic 150s-has recently made changes to antihypertensive therapy currently on labetalol 200 mg p.o. twice daily and Aldomet 250 mg p.o. twice daily.  Denies headache or visual changes.  24 urine on -192 mg of protein normal uric acid, normal LFTs    Patient Active Problem List    Diagnosis Date Noted   • Previous  section 2018     Priority: High   • HTN in pregnancy, chronic 2015     Priority: High   • Essential hypertension 2018   • Pregnancy and insulin-dependent diabetes mellitus in second trimester (HCC) 2018   • Hx of preeclampsia, prior pregnancy, currently pregnant, first trimester 01/10/2018   • High-risk pregnancy, second trimester 2015   • Encounter for long-term (current) use of insulin ([facility]) 2015   • Type 1 diabetes mellitus ([facility]) 2015       Review of systems; denies vaginal bleeding, leakage of fluid, uterine contractions, fever chills or abdominal pain  Past Medical History:   Diagnosis Date   • Diabetes mellitus of mother, complicating pregnancy, childbirth, or the puerperium, unspecified as to episode of care(648.00) 2015   • Encounter for long-term (current) use of insulin ([facility]) 2015   • Type II or unspecified type diabetes mellitus without mention of complication, not stated as uncontrolled      Past Surgical History:   Procedure Laterality Date   • PRIMARY C SECTION  2016    Procedure: PRIMARY C SECTION;  Surgeon: Miriam Quinones M.D.;  Location: LABOR AND DELIVERY;  Service:    • ARTHROSCOPY, KNEE     • OPEN REDUCTION       Patient has no known allergies.  Social History     Social History   • Marital status:      Spouse name: N/A   • Number of children: N/A   • Years of education: N/A     Occupational  "History   • Not on file.     Social History Main Topics   • Smoking status: Never Smoker   • Smokeless tobacco: Never Used   • Alcohol use No   • Drug use: No   • Sexual activity: Yes     Partners: Male     Other Topics Concern   • Not on file     Social History Narrative   • No narrative on file         Physical examination;  Alert and oriented x3  Gen.-well-developed well-nourished female in no apparent distress  HEENT-normocephalic, nontraumatic,EOMI,PERRLA  /83   Pulse 78   Ht 1.549 m (5' 1\")   Wt 67.1 kg (148 lb)   LMP 11/08/2017   BMI 27.96 kg/m²   Skin is warm and dry  Back-negative for CVA tenderness  Cardiovascular-regular rate and rhythm, normal S1-S2 no murmurs gallops  Lungs-clear to stitch bilaterally  Abdomen-nondistended positive bowel sounds soft nontender without masses or hepatosplenomegaly  Cervix-not examined  Extremities without cyanosis clubbing or edema  Neurologic grossly intact    Labs;      NST-as performed and read by myself; reactive NST without contractions    Impression;  IUP AT 32w6d  Gestational hypertension  Type 1 diabetes    Plan;  Continue same dose of antihypertensive medications  Watch blood pressures to the evening-DC to home in a.m. if stable.      Marlon Gu MD  "

## 2018-06-27 NOTE — PROGRESS NOTES
0715- Dr. Gu updated on pt BP's. Orders for labetalol 200 mg PO BID received. Report to JANIE Webb RN

## 2018-06-27 NOTE — NON-PROVIDER
Pt called stating she had taken her blood pressure at home 126/72 and was going to keep  Monitoring it. If it elevated was going to L & D.

## 2018-06-27 NOTE — CARE PLAN
Problem: Discharge Barriers/Planning  Goal: Patient's Continuum of care needs are met  Outcome: PROGRESSING AS EXPECTED  RN will collaborate with MD to make sure pt's needs for d/c are met-I.e. Assist in BP medication admin/education.     Problem: Risk for injury  Goal: Patient and fetus will be free of preventable injury/complications  Outcome: PROGRESSING AS EXPECTED  Pt placed on EFM and TOCO every shift to monitor for fetal well being and uterine activity.

## 2018-06-27 NOTE — PROGRESS NOTES
Progress Note    Subjective: no complaints, no headache or BV. No RUQ pain good fm, some contractions, no LOF or VB    Objective Data:            Vitals:    06/27/18 0857 06/27/18 0957 06/27/18 1057 06/27/18 1157   BP: 146/96 116/69 123/82 159/98   Pulse: 90 80 85 76   Resp:    18   Temp:    36.6 °C (97.8 °F)   Weight:       Height:            reactive no decels  toco irregular contractions    Current Facility-Administered Medications   Medication Dose Route Frequency Provider Last Rate Last Dose   • methyldopa (ALDOMET) tablet 250 mg  250 mg Oral Q8HRS Marlon Gu M.D.   250 mg at 06/27/18 0556   • labetalol (NORMODYNE) tablet 200 mg  200 mg Oral Q8HRS Miriam Quinones M.D.       • hydrALAZINE (APRESOLINE) injection 5-10 mg  5-10 mg Intravenous PRN Miriam Quinones M.D.        Or   • labetalol (NORMODYNE,TRANDATE) injection 20-40 mg  20-40 mg Intravenous PRN Miriam Quinones M.D.           Assessment: IUP at 33 weeks type 1 dm and superimposed pre eclmpsia    Plan: change labetelol to TID

## 2018-06-27 NOTE — PROGRESS NOTES
2145- Pt presented to L&D for c/o increased BP. Pt has been monitored in office and was previously admitted to L&D for elevated BP's. Pt was seen in office today for NST and agreed to come to L&D if BP's remained elevated. Pt is currently taking labetalol 200 mg PO BID and methyldopa 250 mg PO BID with last dose at 1800. Pt denies HA, increased swelling, visual disturbances, right upper quadrant pain. Pt denies regular UC's, LOF, vaginal bleeding, states + fetal movement. EFM and TOCO applied. POC UA performed. Serial BPs performed. Dr. Gu updated. Orders to discharge home received. POC discussed with pt and . Pt desires to stay overnight for observation. Dr. Gu notified. In to see pt. POC discussed .Will monitor BPs overnight.

## 2018-06-28 PROCEDURE — 700102 HCHG RX REV CODE 250 W/ 637 OVERRIDE(OP): Performed by: OBSTETRICS & GYNECOLOGY

## 2018-06-28 PROCEDURE — 59025 FETAL NON-STRESS TEST: CPT | Performed by: OBSTETRICS & GYNECOLOGY

## 2018-06-28 PROCEDURE — A9270 NON-COVERED ITEM OR SERVICE: HCPCS | Performed by: OBSTETRICS & GYNECOLOGY

## 2018-06-28 PROCEDURE — 302790 HCHG STAT ANTEPARTUM CARE, DAILY

## 2018-06-28 RX ORDER — METHYLDOPA 500 MG/1
500 TABLET, FILM COATED ORAL EVERY 8 HOURS
Status: DISCONTINUED | OUTPATIENT
Start: 2018-06-28 | End: 2018-07-04

## 2018-06-28 RX ADMIN — LABETALOL HYDROCHLORIDE 200 MG: 100 TABLET, FILM COATED ORAL at 01:02

## 2018-06-28 RX ADMIN — METHYLDOPA 250 MG: 250 TABLET, FILM COATED ORAL at 05:59

## 2018-06-28 RX ADMIN — LABETALOL HYDROCHLORIDE 200 MG: 100 TABLET, FILM COATED ORAL at 16:57

## 2018-06-28 RX ADMIN — METHYLDOPA 500 MG: 500 TABLET ORAL at 13:54

## 2018-06-28 RX ADMIN — LABETALOL HYDROCHLORIDE 200 MG: 100 TABLET, FILM COATED ORAL at 09:04

## 2018-06-28 RX ADMIN — METHYLDOPA 500 MG: 500 TABLET ORAL at 21:53

## 2018-06-28 ASSESSMENT — PATIENT HEALTH QUESTIONNAIRE - PHQ9
1. LITTLE INTEREST OR PLEASURE IN DOING THINGS: NOT AT ALL
2. FEELING DOWN, DEPRESSED, IRRITABLE, OR HOPELESS: NOT AT ALL
1. LITTLE INTEREST OR PLEASURE IN DOING THINGS: NOT AT ALL
SUM OF ALL RESPONSES TO PHQ9 QUESTIONS 1 AND 2: 0
2. FEELING DOWN, DEPRESSED, IRRITABLE, OR HOPELESS: NOT AT ALL
SUM OF ALL RESPONSES TO PHQ9 QUESTIONS 1 AND 2: 0

## 2018-06-28 ASSESSMENT — PAIN SCALES - GENERAL: PAINLEVEL_OUTOF10: 0

## 2018-06-28 NOTE — CARE PLAN
Problem: Cardiac Output  Goal: Patient will remain normotensive throughout hospitalization    Intervention: Assess and document BP, pulse and oxygen saturation  Pt BP consistent throughout night.

## 2018-06-28 NOTE — PROGRESS NOTES
0700. Report from Nery DONOVAN RN. POC discussed and resumed at the bedside. Pt is requesting to see Dr. Chauhan.    0800. Dr. Chauhan contacted. She will not be here until after her office hours.    0830. Pt up dated that Dr. Chauhan will be in after her office hours. EFM and TOCO applied. Pt denies uc's, leaking, bleeding, HA, epigastric pain or spots in her vision. +FM noted.

## 2018-06-28 NOTE — PROGRESS NOTES
L&D Antepartum Progress Note    Name:   Fatou Thomas   Date/Time:  6/28/2018 8:21 AM  Gestational Age:  33w1d  Admit Date:   6/26/2018  Admitting Dx:   Pregnancy    Subjective:  Denies any headache, visual changes, no abdominal pain, no RUQ pain  Good FM, no contractions, no LOF, no VB    Objective:   Vitals:    06/27/18 1910 06/27/18 2158 06/28/18 0101 06/28/18 0559   BP: 144/94 153/89 145/82 140/95   Pulse: 86 72 73 74   Resp:  16     Temp:  36.7 °C (98 °F)     TempSrc:  Temporal     Weight:       Height:         NST reactive    Meds:     Labs:  Recent Results (from the past 72 hour(s))   POCT Fetal Nonstress Test    Collection Time: 06/26/18 11:17 AM   Result Value Ref Range    NST Indications HTN, DM     NST Baseline 110     NST Uterine Activity occas ctx     NST Acoustic Stimulation n/a     NST Assessment reactive     NST Action Necessary f/u 3d for NST     NST Other Data      NST Return      NST Read By     POC UA    Collection Time: 06/26/18 10:02 PM   Result Value Ref Range    POC Color Yellow     POC Appearance Clear     POC Glucose 250 (A) Negative mg/dL    POC Ketones 80 (A) Negative mg/dL    POC Specific Gravity 1.020 1.005 - 1.030    POC Blood Negative Negative    POC Urine PH 7.0 5.0 - 8.0    POC Protein Negative Negative mg/dL    POC Nitrites Negative Negative    POC Leukocyte Esterase Negative Negative   CBC WITH DIFFERENTIAL    Collection Time: 06/27/18  1:11 PM   Result Value Ref Range    WBC 9.4 4.8 - 10.8 K/uL    RBC 3.95 (L) 4.20 - 5.40 M/uL    Hemoglobin 12.8 12.0 - 16.0 g/dL    Hematocrit 38.3 37.0 - 47.0 %    MCV 97.0 81.4 - 97.8 fL    MCH 32.4 27.0 - 33.0 pg    MCHC 33.4 (L) 33.6 - 35.0 g/dL    RDW 47.9 35.9 - 50.0 fL    Platelet Count 306 164 - 446 K/uL    MPV 10.8 9.0 - 12.9 fL    Neutrophils-Polys 67.10 44.00 - 72.00 %    Lymphocytes 19.10 (L) 22.00 - 41.00 %    Monocytes 9.30 0.00 - 13.40 %    Eosinophils 1.40 0.00 - 6.90 %    Basophils 0.70 0.00 - 1.80 %    Immature  Granulocytes 2.40 (H) 0.00 - 0.90 %    Nucleated RBC 0.00 /100 WBC    Neutrophils (Absolute) 6.32 2.00 - 7.15 K/uL    Lymphs (Absolute) 1.80 1.00 - 4.80 K/uL    Monos (Absolute) 0.88 (H) 0.00 - 0.85 K/uL    Eos (Absolute) 0.13 0.00 - 0.51 K/uL    Baso (Absolute) 0.07 0.00 - 0.12 K/uL    Immature Granulocytes (abs) 0.23 (H) 0.00 - 0.11 K/uL    NRBC (Absolute) 0.00 K/uL   COMP METABOLIC PANEL    Collection Time: 18  1:11 PM   Result Value Ref Range    Sodium 135 135 - 145 mmol/L    Potassium 4.3 3.6 - 5.5 mmol/L    Chloride 105 96 - 112 mmol/L    Co2 23 20 - 33 mmol/L    Anion Gap 7.0 0.0 - 11.9    Glucose 145 (H) 65 - 99 mg/dL    Bun 14 8 - 22 mg/dL    Creatinine 0.68 0.50 - 1.40 mg/dL    Calcium 9.0 8.5 - 10.5 mg/dL    AST(SGOT) 15 12 - 45 U/L    ALT(SGPT) 10 2 - 50 U/L    Alkaline Phosphatase 57 30 - 99 U/L    Total Bilirubin 0.4 0.1 - 1.5 mg/dL    Albumin 2.9 (L) 3.2 - 4.9 g/dL    Total Protein 5.8 (L) 6.0 - 8.2 g/dL    Globulin 2.9 1.9 - 3.5 g/dL    A-G Ratio 1.0 g/dL   ESTIMATED GFR    Collection Time: 18  1:11 PM   Result Value Ref Range    GFR If African American >60 >60 mL/min/1.73 m 2    GFR If Non African American >60 >60 mL/min/1.73 m 2     Assessment:   Gestational Age:   33w1d  Type I DM  CHTN   Increase aldomet to 500 mg TID  Labetalol 200 mg TID  Patient Active Problem List    Diagnosis Date Noted   • Previous  section 2018     Priority: High   • HTN in pregnancy, chronic 2015     Priority: High   • Essential hypertension 2018   • Pregnancy and insulin-dependent diabetes mellitus in second trimester (HCC) 2018   • Hx of preeclampsia, prior pregnancy, currently pregnant, first trimester 01/10/2018   • High-risk pregnancy, second trimester 2015   • Encounter for long-term (current) use of insulin ([facility]) 2015   • Type 1 diabetes mellitus ([facility]) 2015       Carleen Deras M.D.

## 2018-06-28 NOTE — PROGRESS NOTES
1900- Received report from JANIE Webb RN. Assumed care of pt. Pt sitting up in bed, playing cards. Denies c/o HA, visual disturbances, right upper quadrant pain. Discussed POC- will check BP's prior to administering medications, will let pt rest in between.    1910 Pt assessed. WDL. Pt states + fetal movement, denies LOF, vaginal bleeding, states irregular UC's. Pt denies needs at this time. Encouraged to call with needs. Will monitor.

## 2018-06-28 NOTE — CARE PLAN
Problem: Knowledge Deficit  Goal: Patient/Support Person demonstrates understanding regarding condition, prognosis and treatment needs during the pregnancy  Outcome: PROGRESSING AS EXPECTED  Pt and her  are up to date on the POC. They will ask questions as needed and the RN will notify them of any changes.

## 2018-06-29 PROCEDURE — 700102 HCHG RX REV CODE 250 W/ 637 OVERRIDE(OP): Performed by: OBSTETRICS & GYNECOLOGY

## 2018-06-29 PROCEDURE — 302790 HCHG STAT ANTEPARTUM CARE, DAILY

## 2018-06-29 PROCEDURE — 59025 FETAL NON-STRESS TEST: CPT | Performed by: OBSTETRICS & GYNECOLOGY

## 2018-06-29 PROCEDURE — 770002 HCHG ROOM/CARE - OB PRIVATE (112)

## 2018-06-29 PROCEDURE — A9270 NON-COVERED ITEM OR SERVICE: HCPCS | Performed by: OBSTETRICS & GYNECOLOGY

## 2018-06-29 RX ORDER — ASPIRIN 81 MG/1
81 TABLET, CHEWABLE ORAL DAILY
Status: DISCONTINUED | OUTPATIENT
Start: 2018-06-29 | End: 2018-07-01

## 2018-06-29 RX ORDER — VITAMIN A ACETATE, BETA CAROTENE, ASCORBIC ACID, CHOLECALCIFEROL, .ALPHA.-TOCOPHEROL ACETATE, DL-, THIAMINE MONONITRATE, RIBOFLAVIN, NIACINAMIDE, PYRIDOXINE HYDROCHLORIDE, FOLIC ACID, CYANOCOBALAMIN, CALCIUM CARBONATE, FERROUS FUMARATE, ZINC OXIDE, CUPRIC OXIDE 3080; 12; 120; 400; 1; 1.84; 3; 20; 22; 920; 25; 200; 27; 10; 2 [IU]/1; UG/1; MG/1; [IU]/1; MG/1; MG/1; MG/1; MG/1; MG/1; [IU]/1; MG/1; MG/1; MG/1; MG/1; MG/1
1 TABLET, FILM COATED ORAL EVERY EVENING
Status: DISCONTINUED | OUTPATIENT
Start: 2018-06-29 | End: 2018-07-07

## 2018-06-29 RX ADMIN — METHYLDOPA 500 MG: 500 TABLET ORAL at 05:59

## 2018-06-29 RX ADMIN — LABETALOL HYDROCHLORIDE 200 MG: 100 TABLET, FILM COATED ORAL at 17:04

## 2018-06-29 RX ADMIN — METHYLDOPA 500 MG: 500 TABLET ORAL at 21:24

## 2018-06-29 RX ADMIN — ASPIRIN 81 MG: 81 TABLET, CHEWABLE ORAL at 21:24

## 2018-06-29 RX ADMIN — LABETALOL HYDROCHLORIDE 200 MG: 100 TABLET, FILM COATED ORAL at 01:00

## 2018-06-29 RX ADMIN — LABETALOL HYDROCHLORIDE 200 MG: 100 TABLET, FILM COATED ORAL at 09:05

## 2018-06-29 RX ADMIN — Medication 1 TABLET: at 21:24

## 2018-06-29 RX ADMIN — METHYLDOPA 500 MG: 500 TABLET ORAL at 14:02

## 2018-06-29 ASSESSMENT — PAIN SCALES - GENERAL: PAINLEVEL_OUTOF10: 1

## 2018-06-29 NOTE — CARE PLAN
Problem: Pain  Goal: Patient will have relaxed facial expressions and be able to rest between uterine contractions  Outcome: MET Date Met: 06/29/18  Pt not having pain with her UC's, will notify RN if this changes.

## 2018-06-29 NOTE — CARE PLAN
Problem: Risk for Infection, Impaired Wound Healing  Goal: Remain free from signs and symptoms of infection  Outcome: PROGRESSING AS EXPECTED  Educated on hand hygiene, VSS-will continue to monitor    Problem: Pain  Goal: Alleviation of Pain or a reduction in pain to the patient's comfort goal  Outcome: PROGRESSING AS EXPECTED  Will utilize nonpharmacologic techniques and pain meds PRN

## 2018-06-29 NOTE — CARE PLAN
Problem: Knowledge Deficit  Goal: Patient/Support Person demonstrates understanding regarding condition, prognosis and treatment needs during the pregnancy    Intervention: Learning assessment and teaching  Pt understands the POC and asks questions as they present.

## 2018-06-29 NOTE — PROGRESS NOTES
[facility] NOTE:  33W1D TYPE 1 DM with Chronic Hypertension  Patient was readmitted again for very elevated blood pressures after being here on the 22nd.  Since I discharged her she has already required fairly dramatic increases in her daily Labetalol and Aldomet doses.  Her Blood pressures are still in the 140-150/80-90 range on Aldomet 500 mg TID and Labetalol 200 mg TID.  Her labs are stable and not really c/w pre-eclampsia    However, I told Fatou and her  that it doesn't matter whether she has superimposed pre-eclampsia or worsening chronic hypertension, the treatment and my recommendations are going to be the same.  1) It is not safe at this point for her to be followed as an out patient and she has failed out patient therapy anyway.  She will do better and we can prolong the pregnancy safely if she is monitored in hospital.  2) It is not practical for her to be coming in every 2-3 days for BP checks, labs and fetal monitoring.  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  She is currently very upset and crying.  I would make the same recommendation with any patient in the same situation with her current blood pressures.  3)  I told Fatou that given how rapidly she has required this amount of antihypertensives over the past week, that I would anticipate need for delivery between 34-35 weeks anyway  4) She was due for a follow-up fetal growth scan in my office on 7-20 but I told her I would scan the fetus in 2 days.  The FHR is very reassuring.  I do not think she will require delivery for fetal reasons.  5)  I do not think labs need to be repeated for several days unless she develops SXS or her BP's dramatically worsen.  6)  She can have her baby shower here at the hospital.  I am very against her going home at this time.

## 2018-06-29 NOTE — PROGRESS NOTES
MFM NOTE:  33W2D  Chronic hypertension/ probable superimposed preeclampsia  Blood pressures much improved today compared with yesterday and patient is in better spirits.  Doubling her antihypertensive med dosage seems to be working.  1 hour pp is 171 (after breakfast)  Blood sugar control is reasonable but could be better.  NST reactive.    I gave her permission to have her baby shower tomorrow in the 3rd floor conference room  Will repeat ultrasound in the morning.  No change in medications.  15 minutes

## 2018-06-29 NOTE — PROGRESS NOTES
0700  Report from NOC RN in room with pt, pt resting at this time and RN to return later for assessment.  0900  In with pt and PO medications given at this time.  Pt has no report of UC's and no report of bleeding or leaking.  Pt has mild edema around her eyes.  0920  Monitors placed at this time and pt reports lots of fetal movement.  1000  Monitors removed at this time and pt has no new complaints.  Family at BS. 1045  Dr. Chauhan in room, orders received that pt may go to baby shower in PP classroom for 2 hours tomorrow from [id number].  1400  Pt continues to have family visiting throughout the day.  Pt has no new complaints.  1700  Dr. Pichardo into see pt, POC discussed and orders received at this time.  Pt has no new complaints at this time.  1850  Report to NOC RN in room with pt at this time.

## 2018-06-29 NOTE — PROGRESS NOTES
1900 bedside report from RNJOELLE. Pt stable and alert, eating dinner with  at table. t is teary as she has just had discussion w/ Gissel VALDEZ and been told that she will not be medically cleared to go home until after the baby is born. Pt understands the importance for fetal growth, but still upset as she has a small daughter at home. Pt requests wheelchair ride to get some fresh air.    1905 Gissel VALDEZ notified of pt request for wheelchair ride. Approved. Pt updated.    1955 pt off floor for fresh air w/ . Stable and alert.    2015 pt returns to floor. Stable and alert.    0700 bedside report to RN. Pt denies needs at this time. All questions answered. FOB at bedside.

## 2018-06-30 PROCEDURE — 770002 HCHG ROOM/CARE - OB PRIVATE (112)

## 2018-06-30 PROCEDURE — A9270 NON-COVERED ITEM OR SERVICE: HCPCS | Performed by: OBSTETRICS & GYNECOLOGY

## 2018-06-30 PROCEDURE — 700102 HCHG RX REV CODE 250 W/ 637 OVERRIDE(OP): Performed by: OBSTETRICS & GYNECOLOGY

## 2018-06-30 PROCEDURE — 302790 HCHG STAT ANTEPARTUM CARE, DAILY

## 2018-06-30 RX ADMIN — METHYLDOPA 500 MG: 500 TABLET ORAL at 14:04

## 2018-06-30 RX ADMIN — METHYLDOPA 500 MG: 500 TABLET ORAL at 06:07

## 2018-06-30 RX ADMIN — LABETALOL HYDROCHLORIDE 200 MG: 100 TABLET, FILM COATED ORAL at 09:05

## 2018-06-30 RX ADMIN — LABETALOL HYDROCHLORIDE 200 MG: 100 TABLET, FILM COATED ORAL at 01:12

## 2018-06-30 RX ADMIN — ASPIRIN 81 MG: 81 TABLET, CHEWABLE ORAL at 21:59

## 2018-06-30 RX ADMIN — Medication 1 TABLET: at 21:58

## 2018-06-30 RX ADMIN — LABETALOL HYDROCHLORIDE 200 MG: 100 TABLET, FILM COATED ORAL at 17:05

## 2018-06-30 RX ADMIN — METHYLDOPA 500 MG: 500 TABLET ORAL at 21:58

## 2018-06-30 ASSESSMENT — PATIENT HEALTH QUESTIONNAIRE - PHQ9
1. LITTLE INTEREST OR PLEASURE IN DOING THINGS: NOT AT ALL
1. LITTLE INTEREST OR PLEASURE IN DOING THINGS: NOT AT ALL
SUM OF ALL RESPONSES TO PHQ9 QUESTIONS 1 AND 2: 0
SUM OF ALL RESPONSES TO PHQ9 QUESTIONS 1 AND 2: 0
2. FEELING DOWN, DEPRESSED, IRRITABLE, OR HOPELESS: NOT AT ALL
2. FEELING DOWN, DEPRESSED, IRRITABLE, OR HOPELESS: NOT AT ALL

## 2018-06-30 ASSESSMENT — PAIN SCALES - GENERAL: PAINLEVEL_OUTOF10: 0

## 2018-06-30 NOTE — CARE PLAN
Problem: Pain Management  Goal: Pain level will decrease to patient's comfort goal  Outcome: MET Date Met: 06/30/18  Pt not having any pain and will notify RN if this changes.

## 2018-06-30 NOTE — PROGRESS NOTES
L&D Progress Note    Name:   Fatou Thomas   Date/Time:  6/29/2018 5:30 PM  Gestational Age:  33w2d  Admit Date:   6/26/2018  Admitting Dx:   Pregnancy  Indication for care in labor or delivery  TYPE I DM  CHTN with probable superimposed preeclampsia    Subjective:  Doing well  No HA, no visual changes, no RUQ pain  Good FM, no contractions, no LOF, no VB    Objective:   Vitals:    06/29/18 0903 06/29/18 1402 06/29/18 1705 06/29/18 1721   BP: 134/74 132/80 (!) 161/82 151/86   Pulse: 84 90 61 65   Resp:       Temp: 37 °C (98.6 °F)      TempSrc: Temporal      Weight:       Height:         NST - reactive  toco - no activity    Meds:     Labs:  Recent Results (from the past 72 hour(s))   POC UA    Collection Time: 06/26/18 10:02 PM   Result Value Ref Range    POC Color Yellow     POC Appearance Clear     POC Glucose 250 (A) Negative mg/dL    POC Ketones 80 (A) Negative mg/dL    POC Specific Gravity 1.020 1.005 - 1.030    POC Blood Negative Negative    POC Urine PH 7.0 5.0 - 8.0    POC Protein Negative Negative mg/dL    POC Nitrites Negative Negative    POC Leukocyte Esterase Negative Negative   CBC WITH DIFFERENTIAL    Collection Time: 06/27/18  1:11 PM   Result Value Ref Range    WBC 9.4 4.8 - 10.8 K/uL    RBC 3.95 (L) 4.20 - 5.40 M/uL    Hemoglobin 12.8 12.0 - 16.0 g/dL    Hematocrit 38.3 37.0 - 47.0 %    MCV 97.0 81.4 - 97.8 fL    MCH 32.4 27.0 - 33.0 pg    MCHC 33.4 (L) 33.6 - 35.0 g/dL    RDW 47.9 35.9 - 50.0 fL    Platelet Count 306 164 - 446 K/uL    MPV 10.8 9.0 - 12.9 fL    Neutrophils-Polys 67.10 44.00 - 72.00 %    Lymphocytes 19.10 (L) 22.00 - 41.00 %    Monocytes 9.30 0.00 - 13.40 %    Eosinophils 1.40 0.00 - 6.90 %    Basophils 0.70 0.00 - 1.80 %    Immature Granulocytes 2.40 (H) 0.00 - 0.90 %    Nucleated RBC 0.00 /100 WBC    Neutrophils (Absolute) 6.32 2.00 - 7.15 K/uL    Lymphs (Absolute) 1.80 1.00 - 4.80 K/uL    Monos (Absolute) 0.88 (H) 0.00 - 0.85 K/uL    Eos (Absolute) 0.13 0.00 - 0.51 K/uL     Baso (Absolute) 0.07 0.00 - 0.12 K/uL    Immature Granulocytes (abs) 0.23 (H) 0.00 - 0.11 K/uL    NRBC (Absolute) 0.00 K/uL   COMP METABOLIC PANEL    Collection Time: 18  1:11 PM   Result Value Ref Range    Sodium 135 135 - 145 mmol/L    Potassium 4.3 3.6 - 5.5 mmol/L    Chloride 105 96 - 112 mmol/L    Co2 23 20 - 33 mmol/L    Anion Gap 7.0 0.0 - 11.9    Glucose 145 (H) 65 - 99 mg/dL    Bun 14 8 - 22 mg/dL    Creatinine 0.68 0.50 - 1.40 mg/dL    Calcium 9.0 8.5 - 10.5 mg/dL    AST(SGOT) 15 12 - 45 U/L    ALT(SGPT) 10 2 - 50 U/L    Alkaline Phosphatase 57 30 - 99 U/L    Total Bilirubin 0.4 0.1 - 1.5 mg/dL    Albumin 2.9 (L) 3.2 - 4.9 g/dL    Total Protein 5.8 (L) 6.0 - 8.2 g/dL    Globulin 2.9 1.9 - 3.5 g/dL    A-G Ratio 1.0 g/dL   ESTIMATED GFR    Collection Time: 18  1:11 PM   Result Value Ref Range    GFR If African American >60 >60 mL/min/1.73 m 2    GFR If Non African American >60 >60 mL/min/1.73 m 2     Assessment:   Gestational Age:   33w2d  CHTN, probable superimposed preeclampsia. BP better on current meds  Type I DM - pt is monitoring her BS and and giving herself her own insulin  Dr Chauhan following pt - very much appreciated.    Patient Active Problem List    Diagnosis Date Noted   • Previous  section 2018     Priority: High   • HTN in pregnancy, chronic 2015     Priority: High   • Essential hypertension 2018   • Pregnancy and insulin-dependent diabetes mellitus in second trimester (HCC) 2018   • Hx of preeclampsia, prior pregnancy, currently pregnant, first trimester 01/10/2018   • High-risk pregnancy, second trimester 2015   • Encounter for long-term (current) use of insulin (HCC) 2015   • Type 1 diabetes mellitus (HCC) 2015       PAUL KeyD.

## 2018-06-30 NOTE — PROGRESS NOTES
MFM   Note:  33w3d  Chronic hypertension/PIH  Type 1 DM  BP's this morning are good  120/70 but BP's last night were 186/92, 180/99 just before medication dose.  This is probably a better reflection of what her BP's would be at home.  Reports good fetal movement.  NST  Category 1    Limited U/S done at bedside for EFW and BPP  Fetus is vertex, Grade 3 posterior placenta, LEYDI 22 cm., UA Dopplers 2.65  AUA is 32w1d and EFW is 1872 grams (4lbs 2oz)  BPP 6/8 with 2 off for breathing    Impression:  Stable   Fetal status is reassuring.    FTF time:  35 minutes

## 2018-06-30 NOTE — CARE PLAN
Problem: Knowledge Deficit  Goal: Knowledge of disease process/condition, treatment plan, diagnostic tests, and medications will improve  Outcome: MET Date Met: 06/30/18  POC discussed and pt understands her POC, asks questions as they present.

## 2018-06-30 NOTE — PROGRESS NOTES
1900-rcvd bedside report from dayshift RN and assumed care of pt. Pt resting comfortably at this time visiting with FOB.  2130-reactive NST obtained.  0100-two BP readings high 180/80's 90's. BP retaken 30 minutes later 107/62  0600-pt had a restful/uneventful noc. No complaints.  0700-report given to dayshift RN

## 2018-06-30 NOTE — PROGRESS NOTES
0700  Report from NOC RN in room with pt at this time.  Pt resting and RN to return later for assessment.  0835  Monitors placed at this time and pt has no new complaints.  0900  In with pt for vitals.  Pt has no report of leaking or bleeding and no report of UC's that are painful.  Pt reports positive fetal movement.  0935  Monitors removed and pt awaiting visit from Dr. Chauhan.  1220  Dr. Chauhan in room and pt return from W/C ride at this time.  1405  PO medications given at this time and pt getting ready for baby shower.  1455  Pt to baby shower at this time via W/C with JANIE Alvarenga.  1645  Pt returned from baby shower.  1700 PO medications given and family continues visiting at this time.  1850  Report to NOC RN in room with pt at this time.  Pt has no new complaints at this time.

## 2018-07-01 PROCEDURE — 700102 HCHG RX REV CODE 250 W/ 637 OVERRIDE(OP): Performed by: OBSTETRICS & GYNECOLOGY

## 2018-07-01 PROCEDURE — 59025 FETAL NON-STRESS TEST: CPT | Performed by: OBSTETRICS & GYNECOLOGY

## 2018-07-01 PROCEDURE — A9270 NON-COVERED ITEM OR SERVICE: HCPCS | Performed by: OBSTETRICS & GYNECOLOGY

## 2018-07-01 PROCEDURE — 302790 HCHG STAT ANTEPARTUM CARE, DAILY

## 2018-07-01 PROCEDURE — 770002 HCHG ROOM/CARE - OB PRIVATE (112)

## 2018-07-01 RX ORDER — ASPIRIN 81 MG/1
81 TABLET, CHEWABLE ORAL DAILY
Status: DISCONTINUED | OUTPATIENT
Start: 2018-07-01 | End: 2018-07-07

## 2018-07-01 RX ADMIN — Medication 1 TABLET: at 22:02

## 2018-07-01 RX ADMIN — METHYLDOPA 500 MG: 500 TABLET ORAL at 22:02

## 2018-07-01 RX ADMIN — LABETALOL HYDROCHLORIDE 200 MG: 100 TABLET, FILM COATED ORAL at 01:03

## 2018-07-01 RX ADMIN — ASPIRIN 81 MG: 81 TABLET, CHEWABLE ORAL at 22:02

## 2018-07-01 RX ADMIN — LABETALOL HYDROCHLORIDE 200 MG: 100 TABLET, FILM COATED ORAL at 08:51

## 2018-07-01 RX ADMIN — METHYLDOPA 500 MG: 500 TABLET ORAL at 05:53

## 2018-07-01 RX ADMIN — LABETALOL HYDROCHLORIDE 200 MG: 100 TABLET, FILM COATED ORAL at 16:48

## 2018-07-01 RX ADMIN — METHYLDOPA 500 MG: 500 TABLET ORAL at 13:45

## 2018-07-01 ASSESSMENT — PATIENT HEALTH QUESTIONNAIRE - PHQ9
2. FEELING DOWN, DEPRESSED, IRRITABLE, OR HOPELESS: NOT AT ALL
1. LITTLE INTEREST OR PLEASURE IN DOING THINGS: NOT AT ALL
SUM OF ALL RESPONSES TO PHQ9 QUESTIONS 1 AND 2: 0

## 2018-07-01 ASSESSMENT — PAIN SCALES - GENERAL: PAINLEVEL_OUTOF10: 0

## 2018-07-01 NOTE — CARE PLAN
Problem: Risk for Infection, Impaired Wound Healing  Goal: Remain free from signs and symptoms of infection  Outcome: PROGRESSING AS EXPECTED  Educated on hand hygiene. VSS-will continue to monitor    Problem: Pain  Goal: Alleviation of Pain or a reduction in pain to the patient's comfort goal  Outcome: PROGRESSING AS EXPECTED  Pt will utilize nonpharmacologic techniques and pain meds PRN to achieve desired comfort goal

## 2018-07-01 NOTE — PROGRESS NOTES
ANTEPARTUM PROGRESS NOTE;    Fatou Thomas is a 32 y.o. female  at 33w3d.  Denies headache or visual changes    Patient Active Problem List    Diagnosis Date Noted   • Previous  section 2018     Priority: High   • HTN in pregnancy, chronic 2015     Priority: High   • Essential hypertension 2018   • Pregnancy and insulin-dependent diabetes mellitus in second trimester (HCC) 2018   • Hx of preeclampsia, prior pregnancy, currently pregnant, first trimester 01/10/2018   • High-risk pregnancy, second trimester 2015   • Encounter for long-term (current) use of insulin ([facility]) 2015   • Type 1 diabetes mellitus ([facility]) 2015       Review of systems; denies vaginal bleeding, leakage of fluid, uterine contractions, fever chills or abdominal pain  Past Medical History:   Diagnosis Date   • Diabetes mellitus of mother, complicating pregnancy, childbirth, or the puerperium, unspecified as to episode of care(648.00) 2015   • Encounter for long-term (current) use of insulin ([facility]) 2015   • Type II or unspecified type diabetes mellitus without mention of complication, not stated as uncontrolled 1993     Past Surgical History:   Procedure Laterality Date   • PRIMARY C SECTION  2016    Procedure: PRIMARY C SECTION;  Surgeon: Miriam Quinones M.D.;  Location: LABOR AND DELIVERY;  Service:    • ARTHROSCOPY, KNEE     • OPEN REDUCTION       Patient has no known allergies.  Social History     Social History   • Marital status:      Spouse name: N/A   • Number of children: N/A   • Years of education: N/A     Occupational History   • Not on file.     Social History Main Topics   • Smoking status: Never Smoker   • Smokeless tobacco: Never Used   • Alcohol use No   • Drug use: No   • Sexual activity: Yes     Partners: Male     Other Topics Concern   • Not on file     Social History Narrative   • No narrative on file         Physical examination;  Alert and  "oriented x3  Gen.-well-developed well-nourished female in no apparent distress  HEENT-normocephalic, nontraumatic,EOMI,PERRLA  /75   Pulse 90   Temp 36.7 °C (98 °F) (Temporal)   Resp 16   Ht 1.549 m (5' 1\")   Wt 67.1 kg (148 lb)   LMP 11/08/2017   Breastfeeding? No   BMI 27.96 kg/m²   Skin is warm and dry  Back-negative for CVA tenderness  Cardiovascular-regular rate and rhythm, normal S1-S2 no murmurs gallops  Lungs-clear to stitch bilaterally  Abdomen-nondistended positive bowel sounds soft nontender without masses or hepatosplenomegaly  Cervix- NE  Extremities without cyanosis clubbing or edema  Neurologic grossly intact    Labs;      NST-as performed and read by myself; reactive NST without contractions    Impression;  IUP AT 33w3d  Chronic hypertension rule out superimposed preeclampsia  Type 1 diabetes mellitus    Plan;  Ultrasound performed this morning by Dr. Chauhan shows estimated fetal weight 1872 g, cephalic presentation, LEYDI 22  Blood pressures have averaged 120s-130s over 80s to low 90s with some blood pressure spikes but overall patient is doing much better on increased doses of labetalol and Aldomet  Continue bedrest  Patient continues to monitor her own blood sugars      Marlon Gu MD  "

## 2018-07-01 NOTE — PROGRESS NOTES
Progress Note    Subjective: feels well no headaches or BV, no LOF good FM, occasional contractions    Objective Data:            Vitals:    07/01/18 0200 07/01/18 0600 07/01/18 0850 07/01/18 1345   BP: 157/91 103/59 138/85 120/74   Pulse: 67 74 80 73   Resp:   16 16   Temp:   36.6 °C (97.8 °F) 36.7 °C (98.1 °F)   TempSrc:   Temporal Temporal   Weight:       Height:          NR no decels  [place] irregular contractions    Intake/Output Summary (Last 24 hours) at 07/01/18 1503  Last data filed at 06/30/18 1700   Gross per 24 hour   Intake              360 ml   Output                0 ml   Net              360 ml       Current Facility-Administered Medications   Medication Dose Route Frequency Provider Last Rate Last Dose   • aspirin (ASA) chewable tab 81 mg  81 mg Oral DAILY Carleen Deras M.D.       • prenatal plus vitamin (STUARTNATAL 1+1) 27-1 MG tablet 1 Tab  1 Tab Oral Q EVENING Carleen Deras M.D.   1 Tab at 06/30/18 2158   • methyldopa (ALDOMET) tablet 500 mg  500 mg Oral Q8HRS Carleen Deras M.D.   500 mg at 07/01/18 1345   • labetalol (NORMODYNE) tablet 200 mg  200 mg Oral Q8HRS Miriam Quinones M.D.   200 mg at 07/01/18 0851   • hydrALAZINE (APRESOLINE) injection 5-10 mg  5-10 mg Intravenous PRN Miriam Quinones M.D.        Or   • labetalol (NORMODYNE,TRANDATE) injection 20-40 mg  20-40 mg Intravenous PRN Miriam Quinones M.D.           Assessment: IUP 33 3/7 weeks Type 1 DM and superimposed Pre e    Plan:BP under good control on aldomet and labetalol TID  s/p BMZ  Cont in house obs   D/c with MFBRIAN agrees with POC

## 2018-07-01 NOTE — PROGRESS NOTES
0700- Report received from Fatou RN- poc discussed  0800- pt resting no c/o pain, bleeding, LOF, uc's, +FM, no HA's or blurry vision  0850- labetalol given  1200- pt resting no complaints  1600- pt resting no complaints  1700- labetalol given  1900- Report given to Anastasia ALEXIS RN- poc discussed

## 2018-07-01 NOTE — PROGRESS NOTES
1900 bedside report from ZION REYNOLDS. Pt stable and alert, sitting at table and eating dinner w/ mother. Pt denies needs at this time.all questions answered. VSS- will continue w/ current POC.    0700 report to PAUL REYNOLDS. Pt sleeping. All questions answered. Mother of pt sleeping at bedside.

## 2018-07-02 PROCEDURE — 302790 HCHG STAT ANTEPARTUM CARE, DAILY

## 2018-07-02 PROCEDURE — 59025 FETAL NON-STRESS TEST: CPT | Performed by: OBSTETRICS & GYNECOLOGY

## 2018-07-02 PROCEDURE — A9270 NON-COVERED ITEM OR SERVICE: HCPCS | Performed by: OBSTETRICS & GYNECOLOGY

## 2018-07-02 PROCEDURE — 770002 HCHG ROOM/CARE - OB PRIVATE (112)

## 2018-07-02 PROCEDURE — 700102 HCHG RX REV CODE 250 W/ 637 OVERRIDE(OP): Performed by: OBSTETRICS & GYNECOLOGY

## 2018-07-02 RX ADMIN — ASPIRIN 81 MG: 81 TABLET, CHEWABLE ORAL at 21:57

## 2018-07-02 RX ADMIN — METHYLDOPA 500 MG: 500 TABLET ORAL at 06:04

## 2018-07-02 RX ADMIN — LABETALOL HYDROCHLORIDE 200 MG: 100 TABLET, FILM COATED ORAL at 16:41

## 2018-07-02 RX ADMIN — Medication 1 TABLET: at 21:57

## 2018-07-02 RX ADMIN — METHYLDOPA 500 MG: 500 TABLET ORAL at 13:58

## 2018-07-02 RX ADMIN — METHYLDOPA 500 MG: 500 TABLET ORAL at 21:57

## 2018-07-02 RX ADMIN — LABETALOL HYDROCHLORIDE 200 MG: 100 TABLET, FILM COATED ORAL at 08:55

## 2018-07-02 RX ADMIN — LABETALOL HYDROCHLORIDE 200 MG: 100 TABLET, FILM COATED ORAL at 01:06

## 2018-07-02 ASSESSMENT — PAIN SCALES - GENERAL
PAINLEVEL_OUTOF10: 0

## 2018-07-02 ASSESSMENT — PATIENT HEALTH QUESTIONNAIRE - PHQ9
SUM OF ALL RESPONSES TO PHQ9 QUESTIONS 1 AND 2: 0
1. LITTLE INTEREST OR PLEASURE IN DOING THINGS: NOT AT ALL
2. FEELING DOWN, DEPRESSED, IRRITABLE, OR HOPELESS: NOT AT ALL

## 2018-07-02 NOTE — CARE PLAN
Problem: Risk for Infection, Impaired Wound Healing  Goal: Remain free from signs and symptoms of infection  Outcome: PROGRESSING AS EXPECTED  Pt remains free from signs/symptoms of infection.    Problem: Pain  Goal: Alleviation of Pain or a reduction in pain to the patient's comfort goal  Outcome: PROGRESSING AS EXPECTED  Pt denies any pain, able to effectively care for self, able to rest comfortably.

## 2018-07-02 NOTE — CARE PLAN
Problem: Knowledge Deficit  Goal: Patient/Support Person demonstrates understanding regarding condition, prognosis and treatment needs during the pregnancy    Intervention: Learning assessment and teaching  Pt encouraged to verbalize needs and wants      Problem: Cardiac Output  Goal: Patient will remain normotensive throughout hospitalization    Intervention: Assess and document BP, pulse and oxygen saturation  Watching blood pressures

## 2018-07-02 NOTE — PROGRESS NOTES
0700- Received report from GAIL Nguyen.    0900- Pt denies UC's, LOF, or VB, reports +FM.  Denies HA/RUQ pain/increased swelling/vision changes.  Pt medicated (See MAR).    0932- Reactive NST obtained, pt up to shower.  1900- Report to GAIL Nguyen.

## 2018-07-02 NOTE — PROGRESS NOTES
ANTEPARTUM PROGRESS NOTE;    Fatou Thomas is a 32 y.o. female  at 33w5d.  Denies headache or visual changes    Patient Active Problem List    Diagnosis Date Noted   • Previous  section 2018     Priority: High   • HTN in pregnancy, chronic 2015     Priority: High   • Essential hypertension 2018   • Pregnancy and insulin-dependent diabetes mellitus in second trimester (HCC) 2018   • Hx of preeclampsia, prior pregnancy, currently pregnant, first trimester 01/10/2018   • High-risk pregnancy, second trimester 2015   • Encounter for long-term (current) use of insulin ([facility]) 2015   • Type 1 diabetes mellitus ([facility]) 2015       Review of systems; denies vaginal bleeding, leakage of fluid, uterine contractions, fever chills or abdominal pain  Past Medical History:   Diagnosis Date   • Diabetes mellitus of mother, complicating pregnancy, childbirth, or the puerperium, unspecified as to episode of care(648.00) 2015   • Encounter for long-term (current) use of insulin ([facility]) 2015   • Type II or unspecified type diabetes mellitus without mention of complication, not stated as uncontrolled 1993     Past Surgical History:   Procedure Laterality Date   • PRIMARY C SECTION  2016    Procedure: PRIMARY C SECTION;  Surgeon: Miriam Quinones M.D.;  Location: LABOR AND DELIVERY;  Service:    • ARTHROSCOPY, KNEE     • OPEN REDUCTION       Patient has no known allergies.  Social History     Social History   • Marital status:      Spouse name: N/A   • Number of children: N/A   • Years of education: N/A     Occupational History   • Not on file.     Social History Main Topics   • Smoking status: Never Smoker   • Smokeless tobacco: Never Used   • Alcohol use No   • Drug use: No   • Sexual activity: Yes     Partners: Male     Other Topics Concern   • Not on file     Social History Narrative   • No narrative on file         Physical examination;  Alert and  "oriented x3  Gen.-well-developed well-nourished female in no apparent distress  HEENT-normocephalic, nontraumatic,EOMI,PERRLA  /83   Pulse 70   Temp 36.8 °C (98.2 °F) (Temporal)   Resp 16   Ht 1.549 m (5' 1\")   Wt 67.1 kg (148 lb)   LMP 11/08/2017   Breastfeeding? No   BMI 27.96 kg/m²   Skin is warm and dry  Back-negative for CVA tenderness  Cardiovascular-regular rate and rhythm, normal S1-S2 no murmurs gallops  Lungs-clear to stitch bilaterally  Abdomen-nondistended positive bowel sounds soft nontender without masses or hepatosplenomegaly  Cervix- NE  Extremities without cyanosis clubbing or edema  Neurologic grossly intact    Labs;      NST-as performed and read by myself; reactive NST without contractions    Impression;  IUP AT 33w5d  Chronic hypertension-stable  Type 1 diabetes mellitus    Plan;  Continue antihypertensive therapy  Bedrest      Marlon Gu MD  "

## 2018-07-02 NOTE — CARE PLAN
Problem: Risk for Infection, Impaired Wound Healing  Goal: Remain free from signs and symptoms of infection  Outcome: PROGRESSING AS EXPECTED  Pt is afebrile    Problem: Risk for injury  Goal: Patient and fetus will be free of preventable injury/complications  Outcome: PROGRESSING AS EXPECTED  Pt placed on EFM and TOCO to monitor fetal well being and uterine activity. Reactive NST obtained.    Problem: Pain  Goal: Alleviation of Pain or a reduction in pain to the patient's comfort goal  Outcome: PROGRESSING AS EXPECTED  Pt denies pain with UCs

## 2018-07-02 NOTE — PROGRESS NOTES
1900- Report received from PAUL Stephen RN. POC discussed. Pt managing Type I DM herself with MD approval.     2020- Pt assessment. Denies VB, LOF, or pain. Reports UCs, palpate moderate to firm, and +FM.     2023- Reactive NST obtained.    0700- Report given to PAVAN Ashley RN. POC discussed.

## 2018-07-03 PROCEDURE — 302790 HCHG STAT ANTEPARTUM CARE, DAILY

## 2018-07-03 PROCEDURE — 700102 HCHG RX REV CODE 250 W/ 637 OVERRIDE(OP): Performed by: OBSTETRICS & GYNECOLOGY

## 2018-07-03 PROCEDURE — A9270 NON-COVERED ITEM OR SERVICE: HCPCS | Performed by: OBSTETRICS & GYNECOLOGY

## 2018-07-03 PROCEDURE — 59025 FETAL NON-STRESS TEST: CPT | Performed by: OBSTETRICS & GYNECOLOGY

## 2018-07-03 PROCEDURE — 700111 HCHG RX REV CODE 636 W/ 250 OVERRIDE (IP): Performed by: OBSTETRICS & GYNECOLOGY

## 2018-07-03 PROCEDURE — 770002 HCHG ROOM/CARE - OB PRIVATE (112)

## 2018-07-03 RX ADMIN — LABETALOL HYDROCHLORIDE 200 MG: 100 TABLET, FILM COATED ORAL at 08:52

## 2018-07-03 RX ADMIN — METHYLDOPA 500 MG: 500 TABLET ORAL at 22:01

## 2018-07-03 RX ADMIN — METHYLDOPA 500 MG: 500 TABLET ORAL at 06:03

## 2018-07-03 RX ADMIN — HYDRALAZINE HYDROCHLORIDE 5 MG: 20 INJECTION, SOLUTION INTRAMUSCULAR; INTRAVENOUS at 23:23

## 2018-07-03 RX ADMIN — Medication 1 TABLET: at 22:01

## 2018-07-03 RX ADMIN — ASPIRIN 81 MG: 81 TABLET, CHEWABLE ORAL at 22:01

## 2018-07-03 RX ADMIN — METHYLDOPA 500 MG: 500 TABLET ORAL at 13:54

## 2018-07-03 RX ADMIN — LABETALOL HYDROCHLORIDE 200 MG: 100 TABLET, FILM COATED ORAL at 16:57

## 2018-07-03 RX ADMIN — LABETALOL HYDROCHLORIDE 200 MG: 100 TABLET, FILM COATED ORAL at 01:05

## 2018-07-03 ASSESSMENT — PAIN SCALES - GENERAL
PAINLEVEL_OUTOF10: 0

## 2018-07-03 ASSESSMENT — PATIENT HEALTH QUESTIONNAIRE - PHQ9
SUM OF ALL RESPONSES TO PHQ9 QUESTIONS 1 AND 2: 0
2. FEELING DOWN, DEPRESSED, IRRITABLE, OR HOPELESS: NOT AT ALL
1. LITTLE INTEREST OR PLEASURE IN DOING THINGS: NOT AT ALL
1. LITTLE INTEREST OR PLEASURE IN DOING THINGS: NOT AT ALL
SUM OF ALL RESPONSES TO PHQ9 QUESTIONS 1 AND 2: 0
2. FEELING DOWN, DEPRESSED, IRRITABLE, OR HOPELESS: NOT AT ALL

## 2018-07-03 NOTE — PROGRESS NOTES
0700- Received report from GAIL Nguyen.    6622- Pt denies UC's, LOF, or VB, reports +FM.  Denies HA/vision changes/RUQ pain/increased swelling.  VSS.  EFM/TOCO applied.  0930- Reactive NST obtained.  1900- Report to GAIL Nguyen.

## 2018-07-03 NOTE — CARE PLAN
Problem: Risk for Infection, Impaired Wound Healing  Goal: Remain free from signs and symptoms of infection  Outcome: PROGRESSING AS EXPECTED  Pt remains free from signs/symptoms of infection.    Problem: Pain  Goal: Alleviation of Pain or a reduction in pain to the patient's comfort goal  Outcome: PROGRESSING AS EXPECTED  Pt denies pain, able to rest comfortably.

## 2018-07-03 NOTE — CARE PLAN
Problem: Risk for Infection, Impaired Wound Healing  Goal: Remain free from signs and symptoms of infection  Outcome: PROGRESSING AS EXPECTED  Pt is afebrile    Problem: Risk for injury  Goal: Patient and fetus will be free of preventable injury/complications  Outcome: PROGRESSING AS EXPECTED  Pt placed on EFM and TOCO to monitor fetal well being and uterine activity. Reactive NST obtained.    Problem: Pain  Goal: Alleviation of Pain or a reduction in pain to the patient's comfort goal  Outcome: PROGRESSING AS EXPECTED  Pt denies pain

## 2018-07-03 NOTE — PROGRESS NOTES
MFM NOTE:  33w5d today.  BP's are overall better but she does have spikes to 160-170/95 range so I'm not recommending she go home at present.  I want to see what happens over a few more days.  I think she would likely have very elevated BP's at home even though she tries to comply with bedrest,  NST reactive.    No change in medications.  Patient is more resolved that she may need to stay in hospital until delivery

## 2018-07-03 NOTE — PROGRESS NOTES
1900- Report received from PAVAN Ashley RN. POC discussed.     2030- Pt assessment. Denies VB, LOF, or pain. Reports feeling UCs and +FM.     2222- Reactive NST obtained.    0600- No change from previous assessment. Pt reported difficulty sleeping throughout the night.     0700- Report to PAVAN Ashley RN. POC discussed.

## 2018-07-04 LAB
ALBUMIN SERPL BCP-MCNC: 3 G/DL (ref 3.2–4.9)
ALBUMIN/GLOB SERPL: 1.2 G/DL
ALP SERPL-CCNC: 61 U/L (ref 30–99)
ALT SERPL-CCNC: 10 U/L (ref 2–50)
ANION GAP SERPL CALC-SCNC: 8 MMOL/L (ref 0–11.9)
AST SERPL-CCNC: 13 U/L (ref 12–45)
BASOPHILS # BLD AUTO: 0.8 % (ref 0–1.8)
BASOPHILS # BLD: 0.07 K/UL (ref 0–0.12)
BILIRUB SERPL-MCNC: 0.6 MG/DL (ref 0.1–1.5)
BUN SERPL-MCNC: 14 MG/DL (ref 8–22)
CALCIUM SERPL-MCNC: 8.8 MG/DL (ref 8.5–10.5)
CHLORIDE SERPL-SCNC: 106 MMOL/L (ref 96–112)
CO2 SERPL-SCNC: 21 MMOL/L (ref 20–33)
CREAT SERPL-MCNC: 0.66 MG/DL (ref 0.5–1.4)
EOSINOPHIL # BLD AUTO: 0.07 K/UL (ref 0–0.51)
EOSINOPHIL NFR BLD: 0.8 % (ref 0–6.9)
ERYTHROCYTE [DISTWIDTH] IN BLOOD BY AUTOMATED COUNT: 47.4 FL (ref 35.9–50)
EST. AVERAGE GLUCOSE BLD GHB EST-MCNC: 146 MG/DL
GLOBULIN SER CALC-MCNC: 2.5 G/DL (ref 1.9–3.5)
GLUCOSE SERPL-MCNC: 245 MG/DL (ref 65–99)
HBA1C MFR BLD: 6.7 % (ref 0–5.6)
HCT VFR BLD AUTO: 36.8 % (ref 37–47)
HGB BLD-MCNC: 12.6 G/DL (ref 12–16)
IMM GRANULOCYTES # BLD AUTO: 0.08 K/UL (ref 0–0.11)
IMM GRANULOCYTES NFR BLD AUTO: 0.9 % (ref 0–0.9)
LYMPHOCYTES # BLD AUTO: 1.5 K/UL (ref 1–4.8)
LYMPHOCYTES NFR BLD: 16.7 % (ref 22–41)
MCH RBC QN AUTO: 32.7 PG (ref 27–33)
MCHC RBC AUTO-ENTMCNC: 34.2 G/DL (ref 33.6–35)
MCV RBC AUTO: 95.6 FL (ref 81.4–97.8)
MONOCYTES # BLD AUTO: 0.77 K/UL (ref 0–0.85)
MONOCYTES NFR BLD AUTO: 8.6 % (ref 0–13.4)
NEUTROPHILS # BLD AUTO: 6.47 K/UL (ref 2–7.15)
NEUTROPHILS NFR BLD: 72.2 % (ref 44–72)
NRBC # BLD AUTO: 0 K/UL
NRBC BLD-RTO: 0 /100 WBC
PLATELET # BLD AUTO: 220 K/UL (ref 164–446)
PMV BLD AUTO: 11.8 FL (ref 9–12.9)
POTASSIUM SERPL-SCNC: 3.8 MMOL/L (ref 3.6–5.5)
PROT SERPL-MCNC: 5.5 G/DL (ref 6–8.2)
RBC # BLD AUTO: 3.85 M/UL (ref 4.2–5.4)
SODIUM SERPL-SCNC: 135 MMOL/L (ref 135–145)
URATE SERPL-MCNC: 5.5 MG/DL (ref 1.9–8.2)
WBC # BLD AUTO: 9 K/UL (ref 4.8–10.8)

## 2018-07-04 PROCEDURE — 85025 COMPLETE CBC W/AUTO DIFF WBC: CPT

## 2018-07-04 PROCEDURE — 302790 HCHG STAT ANTEPARTUM CARE, DAILY

## 2018-07-04 PROCEDURE — 84550 ASSAY OF BLOOD/URIC ACID: CPT

## 2018-07-04 PROCEDURE — 770002 HCHG ROOM/CARE - OB PRIVATE (112)

## 2018-07-04 PROCEDURE — A9270 NON-COVERED ITEM OR SERVICE: HCPCS | Performed by: OBSTETRICS & GYNECOLOGY

## 2018-07-04 PROCEDURE — 59025 FETAL NON-STRESS TEST: CPT | Performed by: OBSTETRICS & GYNECOLOGY

## 2018-07-04 PROCEDURE — 700102 HCHG RX REV CODE 250 W/ 637 OVERRIDE(OP): Performed by: OBSTETRICS & GYNECOLOGY

## 2018-07-04 PROCEDURE — 83036 HEMOGLOBIN GLYCOSYLATED A1C: CPT

## 2018-07-04 PROCEDURE — 36415 COLL VENOUS BLD VENIPUNCTURE: CPT

## 2018-07-04 PROCEDURE — 80053 COMPREHEN METABOLIC PANEL: CPT

## 2018-07-04 RX ORDER — LABETALOL 100 MG/1
400 TABLET, FILM COATED ORAL EVERY 12 HOURS
Status: DISCONTINUED | OUTPATIENT
Start: 2018-07-04 | End: 2018-07-10 | Stop reason: HOSPADM

## 2018-07-04 RX ORDER — METHYLDOPA 250 MG/1
500 TABLET, FILM COATED ORAL EVERY 6 HOURS
Status: DISCONTINUED | OUTPATIENT
Start: 2018-07-04 | End: 2018-07-10 | Stop reason: HOSPADM

## 2018-07-04 RX ADMIN — METHYLDOPA 500 MG: 500 TABLET ORAL at 06:00

## 2018-07-04 RX ADMIN — LABETALOL HYDROCHLORIDE 400 MG: 100 TABLET, FILM COATED ORAL at 17:56

## 2018-07-04 RX ADMIN — METHYLDOPA 500 MG: 500 TABLET ORAL at 20:03

## 2018-07-04 RX ADMIN — Medication 1 TABLET: at 20:03

## 2018-07-04 RX ADMIN — LABETALOL HYDROCHLORIDE 200 MG: 100 TABLET, FILM COATED ORAL at 08:43

## 2018-07-04 RX ADMIN — METHYLDOPA 500 MG: 500 TABLET ORAL at 13:53

## 2018-07-04 RX ADMIN — ASPIRIN 81 MG: 81 TABLET, CHEWABLE ORAL at 20:03

## 2018-07-04 ASSESSMENT — PATIENT HEALTH QUESTIONNAIRE - PHQ9
1. LITTLE INTEREST OR PLEASURE IN DOING THINGS: NOT AT ALL
1. LITTLE INTEREST OR PLEASURE IN DOING THINGS: NOT AT ALL
2. FEELING DOWN, DEPRESSED, IRRITABLE, OR HOPELESS: NOT AT ALL
2. FEELING DOWN, DEPRESSED, IRRITABLE, OR HOPELESS: NOT AT ALL
SUM OF ALL RESPONSES TO PHQ9 QUESTIONS 1 AND 2: 0
SUM OF ALL RESPONSES TO PHQ9 QUESTIONS 1 AND 2: 0

## 2018-07-04 ASSESSMENT — PAIN SCALES - GENERAL
PAINLEVEL_OUTOF10: 0

## 2018-07-04 NOTE — PROGRESS NOTES
MFM NOTE:  34W0D   Severe chronic hypertension.  Fatou had a bout of high BP's at 2200 with BP's in the 180/100+ range requiring IV hydralazine.  BP's better today.  She did complain of a headache with the high BP's.  NST reactive.  Plan:  I'm going to adjust her medications to try to avoid those late night high BP's.  Labetalol 400mg BID and Aldomet 500 mg q 6 hours.  I would like to see if she can get to 35 weeks before scheduling her repeat C/S.  It will depend on her BP's over the next few days.

## 2018-07-04 NOTE — CARE PLAN
Problem: Infection  Goal: Will remain free from infection  Outcome: PROGRESSING AS EXPECTED  Pt is afebrile

## 2018-07-04 NOTE — PROGRESS NOTES
0700-Report received from Anastasia REYNOLDS. Plan of care assumed. Patient is a , edc 8/15 making her 34.0 weeks. HX of type 1 DM and increase blood pressures.     0840- Assessment done. Patient denies states she feels occasional  Contractions, that are like a tightening. Patient denies any leaking of any fluid or any vaginal bleeding. States positive fetal movement. Patient denies any HA currently, any visions changes or epigastric pain. Patient has insulin pump that she manages as well as her sugars. patient  Sugars 180 while RN in room. Patient states she just had a pastry and pump will treat blood fglucose     0850- Dr Payne. Carleen called RN for a update. Update given on blood pressures. Order received for [facility] labs. Patient agrees with plan of care. Patient requesting to add Hemoglobin A1C. Order received for that as well.     1200- patient resting comfortably. Denies any needs or concerns at this time. Blood glucose 103     1330- Dr Chauhan into see patient. Discussed plan to try to make it to 35 weeks. Discussed change in blood pressure medications with patient.

## 2018-07-04 NOTE — PROGRESS NOTES
L&D Anteparum Progress Note    Name:   Fatou Thomas   Date/Time:  7/4/2018 10:37 AM  Gestational Age:  34w0d  Admit Date:   6/26/2018  Admitting Dx:   Pregnancy  Indication for care in labor or delivery  Type I DM  CHTN with superimposed preeclampsia    Subjective:  Denies headaches, no visual changes, no RUQ pain/no epigastric pain  Good FM, no contractoins, no LOF< no VB    Objective:   Vitals:    07/03/18 2238 07/03/18 2344 07/04/18 0101 07/04/18 0600   BP: (!) 199/103 124/75 109/63 158/77   Pulse: 81 90 (!) 101 67   Resp:   18    Temp:   36.6 °C (97.8 °F) 36.9 °C (98.4 °F)   TempSrc:   Temporal Temporal   Weight:       Height:         NST reactive  toco - no uterine activity    Meds:     Labs:  Recent Results (from the past 72 hour(s))   CBC WITH DIFFERENTIAL    Collection Time: 07/04/18  9:10 AM   Result Value Ref Range    WBC 9.0 4.8 - 10.8 K/uL    RBC 3.85 (L) 4.20 - 5.40 M/uL    Hemoglobin 12.6 12.0 - 16.0 g/dL    Hematocrit 36.8 (L) 37.0 - 47.0 %    MCV 95.6 81.4 - 97.8 fL    MCH 32.7 27.0 - 33.0 pg    MCHC 34.2 33.6 - 35.0 g/dL    RDW 47.4 35.9 - 50.0 fL    Platelet Count 220 164 - 446 K/uL    MPV 11.8 9.0 - 12.9 fL    Neutrophils-Polys 72.20 (H) 44.00 - 72.00 %    Lymphocytes 16.70 (L) 22.00 - 41.00 %    Monocytes 8.60 0.00 - 13.40 %    Eosinophils 0.80 0.00 - 6.90 %    Basophils 0.80 0.00 - 1.80 %    Immature Granulocytes 0.90 0.00 - 0.90 %    Nucleated RBC 0.00 /100 WBC    Neutrophils (Absolute) 6.47 2.00 - 7.15 K/uL    Lymphs (Absolute) 1.50 1.00 - 4.80 K/uL    Monos (Absolute) 0.77 0.00 - 0.85 K/uL    Eos (Absolute) 0.07 0.00 - 0.51 K/uL    Baso (Absolute) 0.07 0.00 - 0.12 K/uL    Immature Granulocytes (abs) 0.08 0.00 - 0.11 K/uL    NRBC (Absolute) 0.00 K/uL   COMP METABOLIC PANEL    Collection Time: 07/04/18  9:10 AM   Result Value Ref Range    Sodium 135 135 - 145 mmol/L    Potassium 3.8 3.6 - 5.5 mmol/L    Chloride 106 96 - 112 mmol/L    Co2 21 20 - 33 mmol/L    Anion Gap 8.0 0.0 - 11.9     Glucose 245 (H) 65 - 99 mg/dL    Bun 14 8 - 22 mg/dL    Creatinine 0.66 0.50 - 1.40 mg/dL    Calcium 8.8 8.5 - 10.5 mg/dL    AST(SGOT) 13 12 - 45 U/L    ALT(SGPT) 10 2 - 50 U/L    Alkaline Phosphatase 61 30 - 99 U/L    Total Bilirubin 0.6 0.1 - 1.5 mg/dL    Albumin 3.0 (L) 3.2 - 4.9 g/dL    Total Protein 5.5 (L) 6.0 - 8.2 g/dL    Globulin 2.5 1.9 - 3.5 g/dL    A-G Ratio 1.2 g/dL   URIC ACID    Collection Time: 18  9:10 AM   Result Value Ref Range    Uric Acid 5.5 1.9 - 8.2 mg/dL   ESTIMATED GFR    Collection Time: 18  9:10 AM   Result Value Ref Range    GFR If African American >60 >60 mL/min/1.73 m 2    GFR If Non African American >60 >60 mL/min/1.73 m 2     Assessment:   Gestational Age:   34w0d  Aldomet 500 TID, Labetalol 200 mg TID  Received hydralazine IV x 1 last night for -190/100-103 then BP were all wnl  PIH today - wnl  Continue to monitor today. Will deliver for uncontrolled BP/severe preeclampsia     Patient Active Problem List    Diagnosis Date Noted   • Previous  section 2018     Priority: High   • HTN in pregnancy, chronic 2015     Priority: High   • Essential hypertension 2018   • Pregnancy and insulin-dependent diabetes mellitus in second trimester ([facility]) 2018   • Hx of preeclampsia, prior pregnancy, currently pregnant, first trimester 01/10/2018   • High-risk pregnancy, second trimester 2015   • Encounter for long-term (current) use of insulin ([facility]) 2015   • Type 1 diabetes mellitus ([facility]) 2015       Carleen Deras M.D.

## 2018-07-04 NOTE — PROGRESS NOTES
1900- Report received from PAVAN Ashley, RN. Pt sitting at table. FOB and JANIE Crawford RN at bedside. POC discussed.     2135- Pt assessment. Denies VB, LOF, pain. Reports +FM and feeling occasional UCs. Monitors applied.    2200- NST completed. Manual B/P 180/100.     2238- /103.  Dr Hopson updated in unit.     2245- Dr. Hopson at bedside for assessment. Order received for IV insertion and hypertensive protocol.    2344- /75 after hydralazine administration    0105- /63. Update to Dr. Hopson by phone. Instructed to hold 0100 dose of labetalol. Pt agrees with POC.     0700- Report to ZION Pérez RN. POC discussed.

## 2018-07-04 NOTE — CARE PLAN
Problem: Risk for Infection, Impaired Wound Healing  Goal: Remain free from signs and symptoms of infection  Outcome: PROGRESSING AS EXPECTED  Patient remains afebrile. No S&S of infections     Problem: Pain  Goal: Alleviation of Pain or a reduction in pain to the patient's comfort goal  Outcome: PROGRESSING AS EXPECTED  Patient states she feels occasional contractions. Describes contractions as a tightening. Patient states she has been feeling these for the past couple of weeks. Patient states last night she felt some more painful contractions. patient will notify RN if any pain or contractions like last night develop

## 2018-07-04 NOTE — PROGRESS NOTES
Fatou Thomas   33w6d  Admission DX: Pregnancy  Indication for care in labor or delivery    Date of Admission: 2018  Patient Active Problem List    Diagnosis Date Noted   • Previous  section 2018     Priority: High   • HTN in pregnancy, chronic 2015     Priority: High   • Essential hypertension 2018   • Pregnancy and insulin-dependent diabetes mellitus in second trimester ([facility]) 2018   • Hx of preeclampsia, prior pregnancy, currently pregnant, first trimester 01/10/2018   • High-risk pregnancy, second trimester 2015   • Encounter for long-term (current) use of insulin ([facility]) 2015   • Type 1 diabetes mellitus ([facility]) 2015       Subjective:   uterine contractions:yes  pain: .yes  LOF: no  vaginal Bleeding: no  fetal movement: normal    Objective:   Vitals:    18 1359 18 1656 18 2200 18 2238   BP: 134/86 130/77 (!) 180/100 (!) 199/103   Pulse: 82 72 76 81   Resp:   18    Temp: 36.7 °C (98.1 °F) 36.6 °C (97.9 °F) 36.8 °C (98.3 °F)    TempSrc: Temporal Temporal Temporal    Weight:       Height:         Gen: appears comfortable  Membranes: ruptured: no  Abdomen: gravid, soft, NT  Ext: SCDs on, Nt, no cyanosis or clubbing    Meds:     Current Facility-Administered Medications:   •  aspirin (ASA) chewable tab 81 mg, 81 mg, Oral, DAILY, Carleen Deras M.D., 81 mg at 18  •  prenatal plus vitamin (STUARTNATAL 1+1) 27-1 MG tablet 1 Tab, 1 Tab, Oral, Q EVENING, Carleen Deras M.D., 1 Tab at 18  •  methyldopa (ALDOMET) tablet 500 mg, 500 mg, Oral, Q8HRS, Carleen Deras M.D., 500 mg at 18  •  labetalol (NORMODYNE) tablet 200 mg, 200 mg, Oral, Q8HRS, Miriam Quinones M.D., 200 mg at 18 8583  •  hydrALAZINE (APRESOLINE) injection 5-10 mg, 5-10 mg, Intravenous, PRN **OR** labetalol (NORMODYNE,TRANDATE) injection 20-40 mg, 20-40 mg, Intravenous, PRN, Miriam COX  ANA MARIA Quinones.    Labs:    Lab: No results found for this or any previous visit (from the past 72 hour(s)).    Assessment:  32 y.o.  @ 33w6d with cHTN on aldomet 500 TID, labetalol 200 TID with sudden onset of severe range pressures, denies HA, does state she is cramping more.  At the time of evaluation the patient is without IV access. I discussed the need for IV antihypertensives given severe range pressure. She understands and agrees. She has questions about delivery plan, I advised we address elevated BP and go from there.     Plan:  Place IV  Hypertensive protocol with hydralazine  Continue to monitor BP  Lorne Hopson M.D.

## 2018-07-04 NOTE — CARE PLAN
Problem: Risk for injury  Goal: Patient and fetus will be free of preventable injury/complications  Outcome: PROGRESSING AS EXPECTED  Pt placed on EFM and TOCO to monitor fetal well being and uterine activity. Reactive NST obtained.    Problem: Pain Management  Goal: Pain level will decrease to patient's comfort goal  Outcome: PROGRESSING AS EXPECTED

## 2018-07-05 PROCEDURE — 700102 HCHG RX REV CODE 250 W/ 637 OVERRIDE(OP): Performed by: OBSTETRICS & GYNECOLOGY

## 2018-07-05 PROCEDURE — 700111 HCHG RX REV CODE 636 W/ 250 OVERRIDE (IP): Performed by: OBSTETRICS & GYNECOLOGY

## 2018-07-05 PROCEDURE — A9270 NON-COVERED ITEM OR SERVICE: HCPCS | Performed by: OBSTETRICS & GYNECOLOGY

## 2018-07-05 PROCEDURE — 770002 HCHG ROOM/CARE - OB PRIVATE (112)

## 2018-07-05 PROCEDURE — 59025 FETAL NON-STRESS TEST: CPT | Performed by: OBSTETRICS & GYNECOLOGY

## 2018-07-05 PROCEDURE — 302790 HCHG STAT ANTEPARTUM CARE, DAILY

## 2018-07-05 RX ADMIN — LABETALOL HYDROCHLORIDE 400 MG: 100 TABLET, FILM COATED ORAL at 06:05

## 2018-07-05 RX ADMIN — METHYLDOPA 500 MG: 500 TABLET ORAL at 02:20

## 2018-07-05 RX ADMIN — METHYLDOPA 500 MG: 500 TABLET ORAL at 08:11

## 2018-07-05 RX ADMIN — METHYLDOPA 500 MG: 500 TABLET ORAL at 20:01

## 2018-07-05 RX ADMIN — LABETALOL HYDROCHLORIDE 400 MG: 100 TABLET, FILM COATED ORAL at 18:02

## 2018-07-05 RX ADMIN — METHYLDOPA 500 MG: 500 TABLET ORAL at 13:56

## 2018-07-05 RX ADMIN — HYDRALAZINE HYDROCHLORIDE 5 MG: 20 INJECTION, SOLUTION INTRAMUSCULAR; INTRAVENOUS at 21:07

## 2018-07-05 RX ADMIN — ASPIRIN 81 MG: 81 TABLET, CHEWABLE ORAL at 20:01

## 2018-07-05 RX ADMIN — Medication 1 TABLET: at 20:01

## 2018-07-05 ASSESSMENT — PAIN SCALES - GENERAL
PAINLEVEL_OUTOF10: 0

## 2018-07-05 ASSESSMENT — PATIENT HEALTH QUESTIONNAIRE - PHQ9
1. LITTLE INTEREST OR PLEASURE IN DOING THINGS: NOT AT ALL
SUM OF ALL RESPONSES TO PHQ9 QUESTIONS 1 AND 2: 0
SUM OF ALL RESPONSES TO PHQ9 QUESTIONS 1 AND 2: 0
1. LITTLE INTEREST OR PLEASURE IN DOING THINGS: NOT AT ALL
2. FEELING DOWN, DEPRESSED, IRRITABLE, OR HOPELESS: NOT AT ALL
2. FEELING DOWN, DEPRESSED, IRRITABLE, OR HOPELESS: NOT AT ALL

## 2018-07-05 NOTE — CARE PLAN
Problem: Risk for injury  Goal: Patient and fetus will be free of preventable injury/complications  Outcome: PROGRESSING AS EXPECTED  Pt placed on EFM and TOCO to monitor fetal well being and uterine activity. Reactive NST obtained.    Problem: Infection  Goal: Will remain free from infection  Outcome: PROGRESSING AS EXPECTED  Pt is afebrile    Problem: Pain Management  Goal: Pain level will decrease to patient's comfort goal  Outcome: PROGRESSING AS EXPECTED  Pt denies pain

## 2018-07-05 NOTE — PROGRESS NOTES
0700- Bedside report received from Anastasia Louis RN- poc discussed  0800- pt resting no c/o pain, bleeding, LOF, uc's, +FM  0815- meds given  1028- monitors placed  1056- reactive nst complete  1400- pt resting no complaints  1600- pt walking in the halls  1900- Report given to Anastasia Louis RN- poc discussed

## 2018-07-05 NOTE — PROGRESS NOTES
1900- Report received from ZION Pérez RN. POC discussed.     2000- Pt assessment. Denies pain, VB, LOF, HA, or vision disturbances. Reports +FM. Feels occasional UCs.    2029- Fetal NST completed.    0605- No change from previous assessment.    0700- Report given to PAUL Stephen RN. POC discussed.

## 2018-07-05 NOTE — PROGRESS NOTES
ANTEPARTUM PROGRESS NOTE;    Fatou Thomas is a 32 y.o. female  at 34w1d no complaints    Patient Active Problem List    Diagnosis Date Noted   • Previous  section 2018     Priority: High   • HTN in pregnancy, chronic 2015     Priority: High   • Essential hypertension 2018   • Pregnancy and insulin-dependent diabetes mellitus in second trimester (HCC) 2018   • Hx of preeclampsia, prior pregnancy, currently pregnant, first trimester 01/10/2018   • High-risk pregnancy, second trimester 2015   • Encounter for long-term (current) use of insulin ([facility]) 2015   • Type 1 diabetes mellitus ([facility]) 2015       Review of systems; denies vaginal bleeding, leakage of fluid, uterine contractions, fever chills or abdominal pain  Past Medical History:   Diagnosis Date   • Diabetes mellitus of mother, complicating pregnancy, childbirth, or the puerperium, unspecified as to episode of care(648.00) 2015   • Encounter for long-term (current) use of insulin ([facility]) 2015   • Type II or unspecified type diabetes mellitus without mention of complication, not stated as uncontrolled 1993     Past Surgical History:   Procedure Laterality Date   • PRIMARY C SECTION  2016    Procedure: PRIMARY C SECTION;  Surgeon: Miriam Quinones M.D.;  Location: LABOR AND DELIVERY;  Service:    • ARTHROSCOPY, KNEE     • OPEN REDUCTION       Patient has no known allergies.  Social History     Social History   • Marital status:      Spouse name: N/A   • Number of children: N/A   • Years of education: N/A     Occupational History   • Not on file.     Social History Main Topics   • Smoking status: Never Smoker   • Smokeless tobacco: Never Used   • Alcohol use No   • Drug use: No   • Sexual activity: Yes     Partners: Male     Other Topics Concern   • Not on file     Social History Narrative   • No narrative on file         Physical examination;  Alert and oriented  "x3  Gen.-well-developed well-nourished female in no apparent distress  HEENT-normocephalic, nontraumatic,EOMI,PERRLA  BP (!) 98/68   Pulse 93   Temp 36.6 °C (97.9 °F) (Temporal)   Resp 16   Ht 1.549 m (5' 1\")   Wt 67.1 kg (148 lb)   LMP 11/08/2017   Breastfeeding? No   BMI 27.96 kg/m²   Skin is warm and dry  Back-negative for CVA tenderness  Cardiovascular-regular rate and rhythm, normal S1-S2 no murmurs gallops  Lungs-clear to stitch bilaterally  Abdomen-nondistended positive bowel sounds soft nontender without masses or hepatosplenomegaly  Cervix- NE  Extremities without cyanosis clubbing or edema  Neurologic grossly intact    Labs;      NST-as performed and read by myself; reactive NST without contractions    Impression;  IUP AT 34w1d  Chronic hypertension-stable  Type 1 diabetes mellitus      Plan;  Continue Aldomet and labetalol  Anticipate delivery somewhere between 35 and 36 weeks  Continue bedrest in the hospital per Dr. Gissel Gu MD  "

## 2018-07-05 NOTE — CARE PLAN
Problem: Knowledge Deficit  Goal: Patient/Support Person demonstrates understanding regarding condition, prognosis and treatment needs during the pregnancy    Intervention: Learning assessment and teaching  Pt encouraged to verbalize needs and wants      Problem: Infection  Goal: Will remain free from infection    Intervention: Assess signs and symptoms of infection  No s/s of infection

## 2018-07-06 LAB
BASOPHILS # BLD AUTO: 0.9 % (ref 0–1.8)
BASOPHILS # BLD: 0.09 K/UL (ref 0–0.12)
EOSINOPHIL # BLD AUTO: 0.11 K/UL (ref 0–0.51)
EOSINOPHIL NFR BLD: 1.1 % (ref 0–6.9)
ERYTHROCYTE [DISTWIDTH] IN BLOOD BY AUTOMATED COUNT: 47.9 FL (ref 35.9–50)
HCT VFR BLD AUTO: 36.7 % (ref 37–47)
HGB BLD-MCNC: 12.5 G/DL (ref 12–16)
HOLDING TUBE BB 8507: NORMAL
IMM GRANULOCYTES # BLD AUTO: 0.07 K/UL (ref 0–0.11)
IMM GRANULOCYTES NFR BLD AUTO: 0.7 % (ref 0–0.9)
LYMPHOCYTES # BLD AUTO: 1.81 K/UL (ref 1–4.8)
LYMPHOCYTES NFR BLD: 17.5 % (ref 22–41)
MCH RBC QN AUTO: 32.6 PG (ref 27–33)
MCHC RBC AUTO-ENTMCNC: 34.1 G/DL (ref 33.6–35)
MCV RBC AUTO: 95.8 FL (ref 81.4–97.8)
MONOCYTES # BLD AUTO: 0.68 K/UL (ref 0–0.85)
MONOCYTES NFR BLD AUTO: 6.6 % (ref 0–13.4)
NEUTROPHILS # BLD AUTO: 7.57 K/UL (ref 2–7.15)
NEUTROPHILS NFR BLD: 73.2 % (ref 44–72)
NRBC # BLD AUTO: 0 K/UL
NRBC BLD-RTO: 0 /100 WBC
PLATELET # BLD AUTO: 229 K/UL (ref 164–446)
PMV BLD AUTO: 11.4 FL (ref 9–12.9)
RBC # BLD AUTO: 3.83 M/UL (ref 4.2–5.4)
WBC # BLD AUTO: 10.3 K/UL (ref 4.8–10.8)

## 2018-07-06 PROCEDURE — 36415 COLL VENOUS BLD VENIPUNCTURE: CPT

## 2018-07-06 PROCEDURE — 700102 HCHG RX REV CODE 250 W/ 637 OVERRIDE(OP): Performed by: OBSTETRICS & GYNECOLOGY

## 2018-07-06 PROCEDURE — 85025 COMPLETE CBC W/AUTO DIFF WBC: CPT

## 2018-07-06 PROCEDURE — 700111 HCHG RX REV CODE 636 W/ 250 OVERRIDE (IP)

## 2018-07-06 PROCEDURE — 90715 TDAP VACCINE 7 YRS/> IM: CPT | Performed by: OBSTETRICS & GYNECOLOGY

## 2018-07-06 PROCEDURE — 700111 HCHG RX REV CODE 636 W/ 250 OVERRIDE (IP): Performed by: OBSTETRICS & GYNECOLOGY

## 2018-07-06 PROCEDURE — A9270 NON-COVERED ITEM OR SERVICE: HCPCS | Performed by: OBSTETRICS & GYNECOLOGY

## 2018-07-06 PROCEDURE — 305385 HCHG SURGICAL SERVICES 1/4 HOUR: Performed by: OBSTETRICS & GYNECOLOGY

## 2018-07-06 PROCEDURE — 59025 FETAL NON-STRESS TEST: CPT | Performed by: OBSTETRICS & GYNECOLOGY

## 2018-07-06 PROCEDURE — 700105 HCHG RX REV CODE 258

## 2018-07-06 PROCEDURE — 770002 HCHG ROOM/CARE - OB PRIVATE (112)

## 2018-07-06 PROCEDURE — 304966 HCHG RECOVERY SVSC TIME ADDL 1/2 HR: Performed by: OBSTETRICS & GYNECOLOGY

## 2018-07-06 PROCEDURE — 306828 HCHG ANES-TIME GENERAL: Performed by: OBSTETRICS & GYNECOLOGY

## 2018-07-06 PROCEDURE — 700101 HCHG RX REV CODE 250

## 2018-07-06 PROCEDURE — 302790 HCHG STAT ANTEPARTUM CARE, DAILY

## 2018-07-06 PROCEDURE — 700102 HCHG RX REV CODE 250 W/ 637 OVERRIDE(OP)

## 2018-07-06 PROCEDURE — 304964 HCHG RECOVERY ROOM TIME 1HR: Performed by: OBSTETRICS & GYNECOLOGY

## 2018-07-06 RX ORDER — SODIUM CHLORIDE, SODIUM LACTATE, POTASSIUM CHLORIDE, CALCIUM CHLORIDE 600; 310; 30; 20 MG/100ML; MG/100ML; MG/100ML; MG/100ML
INJECTION, SOLUTION INTRAVENOUS CONTINUOUS
Status: DISCONTINUED | OUTPATIENT
Start: 2018-07-06 | End: 2018-07-07

## 2018-07-06 RX ORDER — MISOPROSTOL 200 UG/1
800 TABLET ORAL
Status: DISCONTINUED | OUTPATIENT
Start: 2018-07-06 | End: 2018-07-07

## 2018-07-06 RX ORDER — SODIUM CHLORIDE, SODIUM GLUCONATE, SODIUM ACETATE, POTASSIUM CHLORIDE AND MAGNESIUM CHLORIDE 526; 502; 368; 37; 30 MG/100ML; MG/100ML; MG/100ML; MG/100ML; MG/100ML
INJECTION, SOLUTION INTRAVENOUS
Status: COMPLETED
Start: 2018-07-06 | End: 2018-07-06

## 2018-07-06 RX ORDER — CITRIC ACID/SODIUM CITRATE 334-500MG
SOLUTION, ORAL ORAL
Status: COMPLETED
Start: 2018-07-06 | End: 2018-07-06

## 2018-07-06 RX ORDER — DEXTROSE MONOHYDRATE 25 G/50ML
25 INJECTION, SOLUTION INTRAVENOUS
Status: DISCONTINUED | OUTPATIENT
Start: 2018-07-06 | End: 2018-07-10 | Stop reason: HOSPADM

## 2018-07-06 RX ORDER — ERYTHROMYCIN 5 MG/G
OINTMENT OPHTHALMIC
Status: COMPLETED
Start: 2018-07-06 | End: 2018-07-06

## 2018-07-06 RX ORDER — CEFAZOLIN SODIUM 1 G/3ML
1 INJECTION, POWDER, FOR SOLUTION INTRAMUSCULAR; INTRAVENOUS ONCE
Status: DISCONTINUED | OUTPATIENT
Start: 2018-07-06 | End: 2018-07-07

## 2018-07-06 RX ORDER — CARBOPROST TROMETHAMINE 250 UG/ML
250 INJECTION, SOLUTION INTRAMUSCULAR
Status: DISCONTINUED | OUTPATIENT
Start: 2018-07-06 | End: 2018-07-07

## 2018-07-06 RX ORDER — PHYTONADIONE 2 MG/ML
INJECTION, EMULSION INTRAMUSCULAR; INTRAVENOUS; SUBCUTANEOUS
Status: COMPLETED
Start: 2018-07-06 | End: 2018-07-06

## 2018-07-06 RX ADMIN — LABETALOL HYDROCHLORIDE 400 MG: 100 TABLET, FILM COATED ORAL at 06:07

## 2018-07-06 RX ADMIN — METHYLDOPA 500 MG: 500 TABLET ORAL at 13:58

## 2018-07-06 RX ADMIN — ASPIRIN 81 MG: 81 TABLET, CHEWABLE ORAL at 19:52

## 2018-07-06 RX ADMIN — METHYLDOPA 500 MG: 500 TABLET ORAL at 07:56

## 2018-07-06 RX ADMIN — SODIUM CHLORIDE, SODIUM GLUCONATE, SODIUM ACETATE, POTASSIUM CHLORIDE AND MAGNESIUM CHLORIDE 1000 ML: 526; 502; 368; 37; 30 INJECTION, SOLUTION INTRAVENOUS at 22:09

## 2018-07-06 RX ADMIN — SODIUM CITRATE AND CITRIC ACID MONOHYDRATE 30 ML: 500; 334 SOLUTION ORAL at 22:09

## 2018-07-06 RX ADMIN — METHYLDOPA 500 MG: 500 TABLET ORAL at 02:07

## 2018-07-06 RX ADMIN — LABETALOL HYDROCHLORIDE 400 MG: 100 TABLET, FILM COATED ORAL at 18:03

## 2018-07-06 RX ADMIN — METHYLDOPA 500 MG: 500 TABLET ORAL at 19:52

## 2018-07-06 RX ADMIN — CLOSTRIDIUM TETANI TOXOID ANTIGEN (FORMALDEHYDE INACTIVATED), CORYNEBACTERIUM DIPHTHERIAE TOXOID ANTIGEN (FORMALDEHYDE INACTIVATED), BORDETELLA PERTUSSIS TOXOID ANTIGEN (GLUTARALDEHYDE INACTIVATED), BORDETELLA PERTUSSIS FILAMENTOUS HEMAGGLUTININ ANTIGEN (FORMALDEHYDE INACTIVATED), BORDETELLA PERTUSSIS PERTACTIN ANTIGEN, AND BORDETELLA PERTUSSIS FIMBRIAE 2/3 ANTIGEN 0.5 ML: 5; 2; 2.5; 5; 3; 5 INJECTION, SUSPENSION INTRAMUSCULAR at 09:44

## 2018-07-06 RX ADMIN — HYDRALAZINE HYDROCHLORIDE 5 MG: 20 INJECTION, SOLUTION INTRAMUSCULAR; INTRAVENOUS at 21:40

## 2018-07-06 RX ADMIN — Medication 1 TABLET: at 19:52

## 2018-07-06 ASSESSMENT — PAIN SCALES - GENERAL
PAINLEVEL_OUTOF10: 0

## 2018-07-06 ASSESSMENT — PATIENT HEALTH QUESTIONNAIRE - PHQ9
1. LITTLE INTEREST OR PLEASURE IN DOING THINGS: NOT AT ALL
2. FEELING DOWN, DEPRESSED, IRRITABLE, OR HOPELESS: NOT AT ALL
SUM OF ALL RESPONSES TO PHQ9 QUESTIONS 1 AND 2: 0

## 2018-07-06 NOTE — PROGRESS NOTES
1900- Report received from PAUL Stephen RN. Pt sitting up in chair. Patient's mother, daughter, friend and friend's daughter at bedside. POC discussed.    2000- Pt assessment. Denies VB, LOF, pain. Feels occasional UCs and +FM.     2006- BPs elevated. Cycling every 10 minutes.     2031- BPs still elevated. Pt agreed to IV placement and hypertensive protocol.    2107- IV placed. Hydralazine administered per MAR.    2131- /68    2227-Fetal NST complete    2300- /76. Pt refusing TDAP at this time. Requesting administration in the AM.    2335- Update to Dr. Gu regarding previous BPs. No change to scheduled medications.     0607- /76. No change from previous assessment.    0700- Report to ADRIANNE Trejo RN. POC discussed.

## 2018-07-06 NOTE — CARE PLAN
Problem: Risk for Infection, Impaired Wound Healing  Goal: Remain free from signs and symptoms of infection  Outcome: PROGRESSING AS EXPECTED  Pt is afebrile    Problem: Risk for injury  Goal: Patient and fetus will be free of preventable injury/complications  Outcome: PROGRESSING AS EXPECTED  Pt placed on EFM and TOCO to monitor fetal well being and uterine activity. Reactive NST obtained.    Problem: Pain  Goal: Alleviation of Pain or a reduction in pain to the patient's comfort goal  Outcome: PROGRESSING AS EXPECTED  Pt denies pain. UCs are tolerable.

## 2018-07-06 NOTE — PROGRESS NOTES
Pt had a reactive NST with lots of audible fetal movement. Pt now in shower and room cleaned . No needs at present time BP holding.

## 2018-07-06 NOTE — CARE PLAN
Problem: Discharge Barriers/Planning  Goal: Patient's continuum of care needs will be met    Intervention: Assess potential discharge barriers on admission and throughout hospital stay  Pt cont to have periods of eleavated BPs in spite of meds rest and other measures. Cont to monitor.

## 2018-07-06 NOTE — PROGRESS NOTES
Progress Note    Subjective: feels well, good FM no contractions, VB or LOF  No headache or BV.      Objective Data:  Recent Labs      07/04/18   0910   WBC  9.0   RBC  3.85*   HEMOGLOBIN  12.6   HEMATOCRIT  36.8*   MCV  95.6   MCH  32.7   MCHC  34.2   RDW  47.4   PLATELETCT  220   MPV  11.8     Recent Labs      07/04/18   0910   SODIUM  135   POTASSIUM  3.8   CHLORIDE  106   CO2  21   GLUCOSE  245*   BUN  14   CREATININE  0.66   CALCIUM  8.8       Vitals:    07/05/18 2300 07/06/18 0206 07/06/18 0607 07/06/18 0758   BP: 123/76 109/62 129/76 145/79   Pulse: 71 73 85 66   Resp:   16    Temp:   36.9 °C (98.4 °F) 36.2 °C (97.2 °F)   TempSrc:   Temporal Temporal   Weight:       Height:            reactive no decels  toco irregular contractions    Current Facility-Administered Medications   Medication Dose Route Frequency Provider Last Rate Last Dose   • methyldopa (ALDOMET) tablet 500 mg  500 mg Oral Q6HRS Joycelyn Chauhan M.D.   500 mg at 07/06/18 0756   • labetalol (NORMODYNE) tablet 400 mg  400 mg Oral Q12HR Joycelyn Chauhan M.D.   400 mg at 07/06/18 0607   • aspirin (ASA) chewable tab 81 mg  81 mg Oral DAILY Carleen Deras M.D.   81 mg at 07/05/18 2001   • prenatal plus vitamin (STUARTNATAL 1+1) 27-1 MG tablet 1 Tab  1 Tab Oral Q EVENING Carleen Deras M.D.   1 Tab at 07/05/18 2001   • hydrALAZINE (APRESOLINE) injection 5-10 mg  5-10 mg Intravenous PRN Miriam Quinones M.D.   5 mg at 07/05/18 2107    Or   • labetalol (NORMODYNE,TRANDATE) injection 20-40 mg  20-40 mg Intravenous PRN Miriam Quinones M.D.           Assessment: IUP 34 2/7 with CHTN/super imposed pre E, Type 1 DM      Plan: s/p BMZ  Control Bp  CS 7/11/18 7:30 am

## 2018-07-06 NOTE — FLOWSHEET NOTE
0652- Report received care assumed; patient sleeping in bed - family at BS; patient is expecting visitors sometime today. Reports good sleep last night, denies contractions/discomfort denies ill feeling, reports frequent FM, no ROM no Bleeding, No change to vision/edema/HA; states she has been getting up to the BR herself without assist denies dizziness or weakness. Gestation today of 34.2 weeks, FM/[place] to be placed after breakfast when pt is ready, discussed POC, Cheerful affect/mood, denies questions/concerns at this point, states understanding of POC/expectiations Patient encouraged to call RN with all questions/concerns/needs. RN contact information written on the white information board.

## 2018-07-07 LAB
ALBUMIN SERPL BCP-MCNC: 2.7 G/DL (ref 3.2–4.9)
ALBUMIN/GLOB SERPL: 1.3 G/DL
ALP SERPL-CCNC: 52 U/L (ref 30–99)
ALT SERPL-CCNC: 7 U/L (ref 2–50)
ANION GAP SERPL CALC-SCNC: 5 MMOL/L (ref 0–11.9)
AST SERPL-CCNC: 17 U/L (ref 12–45)
BILIRUB SERPL-MCNC: 0.7 MG/DL (ref 0.1–1.5)
BUN SERPL-MCNC: 12 MG/DL (ref 8–22)
CALCIUM SERPL-MCNC: 8.2 MG/DL (ref 8.5–10.5)
CHLORIDE SERPL-SCNC: 105 MMOL/L (ref 96–112)
CO2 SERPL-SCNC: 23 MMOL/L (ref 20–33)
CREAT SERPL-MCNC: 0.66 MG/DL (ref 0.5–1.4)
ERYTHROCYTE [DISTWIDTH] IN BLOOD BY AUTOMATED COUNT: 48.4 FL (ref 35.9–50)
GLOBULIN SER CALC-MCNC: 2.1 G/DL (ref 1.9–3.5)
GLUCOSE SERPL-MCNC: 144 MG/DL (ref 65–99)
HCT VFR BLD AUTO: 32.1 % (ref 37–47)
HGB BLD-MCNC: 10.9 G/DL (ref 12–16)
MCH RBC QN AUTO: 33 PG (ref 27–33)
MCHC RBC AUTO-ENTMCNC: 34 G/DL (ref 33.6–35)
MCV RBC AUTO: 97.3 FL (ref 81.4–97.8)
PLATELET # BLD AUTO: 199 K/UL (ref 164–446)
PMV BLD AUTO: 11.8 FL (ref 9–12.9)
POTASSIUM SERPL-SCNC: 4 MMOL/L (ref 3.6–5.5)
PROT SERPL-MCNC: 4.8 G/DL (ref 6–8.2)
RBC # BLD AUTO: 3.3 M/UL (ref 4.2–5.4)
SODIUM SERPL-SCNC: 133 MMOL/L (ref 135–145)
WBC # BLD AUTO: 13.9 K/UL (ref 4.8–10.8)

## 2018-07-07 PROCEDURE — 770002 HCHG ROOM/CARE - OB PRIVATE (112)

## 2018-07-07 PROCEDURE — 80053 COMPREHEN METABOLIC PANEL: CPT

## 2018-07-07 PROCEDURE — 700111 HCHG RX REV CODE 636 W/ 250 OVERRIDE (IP): Performed by: OBSTETRICS & GYNECOLOGY

## 2018-07-07 PROCEDURE — 700102 HCHG RX REV CODE 250 W/ 637 OVERRIDE(OP)

## 2018-07-07 PROCEDURE — 700111 HCHG RX REV CODE 636 W/ 250 OVERRIDE (IP)

## 2018-07-07 PROCEDURE — 700102 HCHG RX REV CODE 250 W/ 637 OVERRIDE(OP): Performed by: OBSTETRICS & GYNECOLOGY

## 2018-07-07 PROCEDURE — 36415 COLL VENOUS BLD VENIPUNCTURE: CPT

## 2018-07-07 PROCEDURE — A9270 NON-COVERED ITEM OR SERVICE: HCPCS

## 2018-07-07 PROCEDURE — A9270 NON-COVERED ITEM OR SERVICE: HCPCS | Performed by: OBSTETRICS & GYNECOLOGY

## 2018-07-07 PROCEDURE — 59514 CESAREAN DELIVERY ONLY: CPT

## 2018-07-07 PROCEDURE — 85027 COMPLETE CBC AUTOMATED: CPT

## 2018-07-07 RX ORDER — OXYCODONE HYDROCHLORIDE AND ACETAMINOPHEN 5; 325 MG/1; MG/1
2 TABLET ORAL ONCE
Status: COMPLETED | OUTPATIENT
Start: 2018-07-07 | End: 2018-07-07

## 2018-07-07 RX ORDER — KETOROLAC TROMETHAMINE 30 MG/ML
INJECTION, SOLUTION INTRAMUSCULAR; INTRAVENOUS
Status: COMPLETED
Start: 2018-07-07 | End: 2018-07-07

## 2018-07-07 RX ORDER — MORPHINE SULFATE 4 MG/ML
4 INJECTION, SOLUTION INTRAMUSCULAR; INTRAVENOUS
Status: DISCONTINUED | OUTPATIENT
Start: 2018-07-07 | End: 2018-07-10 | Stop reason: HOSPADM

## 2018-07-07 RX ORDER — OXYCODONE HYDROCHLORIDE AND ACETAMINOPHEN 5; 325 MG/1; MG/1
TABLET ORAL
Status: COMPLETED
Start: 2018-07-07 | End: 2018-07-07

## 2018-07-07 RX ORDER — OXYCODONE HYDROCHLORIDE 10 MG/1
10 TABLET ORAL EVERY 4 HOURS PRN
Status: DISCONTINUED | OUTPATIENT
Start: 2018-07-07 | End: 2018-07-10 | Stop reason: HOSPADM

## 2018-07-07 RX ORDER — VITAMIN A ACETATE, BETA CAROTENE, ASCORBIC ACID, CHOLECALCIFEROL, .ALPHA.-TOCOPHEROL ACETATE, DL-, THIAMINE MONONITRATE, RIBOFLAVIN, NIACINAMIDE, PYRIDOXINE HYDROCHLORIDE, FOLIC ACID, CYANOCOBALAMIN, CALCIUM CARBONATE, FERROUS FUMARATE, ZINC OXIDE, CUPRIC OXIDE 3080; 12; 120; 400; 1; 1.84; 3; 20; 22; 920; 25; 200; 27; 10; 2 [IU]/1; UG/1; MG/1; [IU]/1; MG/1; MG/1; MG/1; MG/1; MG/1; [IU]/1; MG/1; MG/1; MG/1; MG/1; MG/1
1 TABLET, FILM COATED ORAL EVERY MORNING
Status: DISCONTINUED | OUTPATIENT
Start: 2018-07-07 | End: 2018-07-10 | Stop reason: HOSPADM

## 2018-07-07 RX ORDER — CARBOPROST TROMETHAMINE 250 UG/ML
250 INJECTION, SOLUTION INTRAMUSCULAR
Status: DISCONTINUED | OUTPATIENT
Start: 2018-07-07 | End: 2018-07-10 | Stop reason: HOSPADM

## 2018-07-07 RX ORDER — MISOPROSTOL 200 UG/1
600 TABLET ORAL
Status: DISCONTINUED | OUTPATIENT
Start: 2018-07-07 | End: 2018-07-10 | Stop reason: HOSPADM

## 2018-07-07 RX ORDER — OXYCODONE HYDROCHLORIDE 5 MG/1
5 TABLET ORAL EVERY 4 HOURS PRN
Status: DISCONTINUED | OUTPATIENT
Start: 2018-07-07 | End: 2018-07-10 | Stop reason: HOSPADM

## 2018-07-07 RX ORDER — ONDANSETRON 4 MG/1
4 TABLET, ORALLY DISINTEGRATING ORAL EVERY 6 HOURS PRN
Status: DISCONTINUED | OUTPATIENT
Start: 2018-07-07 | End: 2018-07-10 | Stop reason: HOSPADM

## 2018-07-07 RX ORDER — MORPHINE SULFATE 4 MG/ML
4 INJECTION, SOLUTION INTRAMUSCULAR; INTRAVENOUS
Status: DISCONTINUED | OUTPATIENT
Start: 2018-07-07 | End: 2018-07-07

## 2018-07-07 RX ORDER — DOCUSATE SODIUM 100 MG/1
100 CAPSULE, LIQUID FILLED ORAL 2 TIMES DAILY PRN
Status: DISCONTINUED | OUTPATIENT
Start: 2018-07-07 | End: 2018-07-10 | Stop reason: HOSPADM

## 2018-07-07 RX ORDER — ONDANSETRON 2 MG/ML
4 INJECTION INTRAMUSCULAR; INTRAVENOUS EVERY 6 HOURS PRN
Status: DISCONTINUED | OUTPATIENT
Start: 2018-07-07 | End: 2018-07-10 | Stop reason: HOSPADM

## 2018-07-07 RX ORDER — SODIUM CHLORIDE, SODIUM LACTATE, POTASSIUM CHLORIDE, CALCIUM CHLORIDE 600; 310; 30; 20 MG/100ML; MG/100ML; MG/100ML; MG/100ML
INJECTION, SOLUTION INTRAVENOUS PRN
Status: DISCONTINUED | OUTPATIENT
Start: 2018-07-07 | End: 2018-07-10 | Stop reason: HOSPADM

## 2018-07-07 RX ORDER — DIPHENHYDRAMINE HCL 25 MG
25 TABLET ORAL EVERY 6 HOURS PRN
Status: DISCONTINUED | OUTPATIENT
Start: 2018-07-07 | End: 2018-07-10 | Stop reason: HOSPADM

## 2018-07-07 RX ORDER — DIPHENHYDRAMINE HYDROCHLORIDE 50 MG/ML
25 INJECTION INTRAMUSCULAR; INTRAVENOUS EVERY 6 HOURS PRN
Status: DISCONTINUED | OUTPATIENT
Start: 2018-07-07 | End: 2018-07-10 | Stop reason: HOSPADM

## 2018-07-07 RX ORDER — KETOROLAC TROMETHAMINE 30 MG/ML
30 INJECTION, SOLUTION INTRAMUSCULAR; INTRAVENOUS EVERY 6 HOURS PRN
Status: DISCONTINUED | OUTPATIENT
Start: 2018-07-07 | End: 2018-07-10 | Stop reason: HOSPADM

## 2018-07-07 RX ADMIN — OXYCODONE HYDROCHLORIDE AND ACETAMINOPHEN 2 TABLET: 5; 325 TABLET ORAL at 00:26

## 2018-07-07 RX ADMIN — METHYLDOPA 500 MG: 500 TABLET ORAL at 21:15

## 2018-07-07 RX ADMIN — KETOROLAC TROMETHAMINE 30 MG: 30 INJECTION, SOLUTION INTRAMUSCULAR at 12:08

## 2018-07-07 RX ADMIN — MORPHINE SULFATE 4 MG: 4 INJECTION INTRAVENOUS at 09:03

## 2018-07-07 RX ADMIN — Medication 1 TABLET: at 08:26

## 2018-07-07 RX ADMIN — OXYCODONE HYDROCHLORIDE 10 MG: 10 TABLET ORAL at 08:26

## 2018-07-07 RX ADMIN — OXYCODONE HYDROCHLORIDE 10 MG: 10 TABLET ORAL at 04:33

## 2018-07-07 RX ADMIN — MORPHINE SULFATE 4 MG: 4 INJECTION INTRAVENOUS at 06:01

## 2018-07-07 RX ADMIN — OXYCODONE HYDROCHLORIDE 10 MG: 10 TABLET ORAL at 16:36

## 2018-07-07 RX ADMIN — OXYCODONE HYDROCHLORIDE 10 MG: 10 TABLET ORAL at 12:28

## 2018-07-07 RX ADMIN — OXYCODONE HYDROCHLORIDE 10 MG: 10 TABLET ORAL at 21:09

## 2018-07-07 RX ADMIN — KETOROLAC TROMETHAMINE 30 MG: 30 INJECTION, SOLUTION INTRAMUSCULAR at 18:20

## 2018-07-07 RX ADMIN — OXYCODONE AND ACETAMINOPHEN 2 TABLET: 5; 325 TABLET ORAL at 00:26

## 2018-07-07 ASSESSMENT — PAIN SCALES - GENERAL
PAINLEVEL_OUTOF10: 7
PAINLEVEL_OUTOF10: 6
PAINLEVEL_OUTOF10: 7
PAINLEVEL_OUTOF10: 4
PAINLEVEL_OUTOF10: 3
PAINLEVEL_OUTOF10: 4
PAINLEVEL_OUTOF10: 2
PAINLEVEL_OUTOF10: 3
PAINLEVEL_OUTOF10: 2
PAINLEVEL_OUTOF10: 0

## 2018-07-07 NOTE — PROGRESS NOTES
2215) Report received from DEANNE Griggs RN.  Pt prepped for  section.  Dr Magdaleno at bedside to discuss POC  2342) Pt transferred to OR 2 for c/s  0030) Pain medication provided.  Pt positioned for pumping in PACU.  0120) VSS,  Pt transferred from PACU to  227 for overnight observation  0230) Dr Quinones updated on pt BP's and status, orders received to hold PO BP medications.    0300) Family at bedside, pt positioned for comfort and pumping.  POC discussed.  Pt desires to keep up on a scheduled routine for pain intervention.  Pain at this time acceptable per pt.    0430) Pain medication provided  0600) Morphine given for breakthrough pain.  FOB assisting with pumping  0700) Pt states that pain at acceptable level, now able to sit up without discomfort.    Dr Quinones updated on urine output, BP's, and BS.  Orders received.    0730) Report to JENNI Evans RN

## 2018-07-07 NOTE — PROGRESS NOTES
1900: Received report from ADRIANNE Trejo RN. POC discussed.     2000: POC discussed with pt, verbalizes understanding.    2115: Called Dr. Chauhan, notified of BP's 177/96 and 195/101. Per MD, recheck Bp in 15 minutes and if remains high give 5mg of Hydralazine IM.    2140: Called Dr. Quinones updated on Bp's 177/96, 195/101, 207/109 and interventions done per pt request.    2200: Dr. Quinones at bedside to discuss POC with pt. C/s called. Pt prepped for c/s.    2209: Dr. Magdaleno at bedside, /83 currently, per MD hold Hydralazine at this time. Pre-op meds given.     2245: Report given to PAVAN Gonzalez, Charge RN.

## 2018-07-07 NOTE — OR SURGEON
Immediate Post OP Note    PreOp Diagnosis: IUP at 34 2/7 weeks, severe uncontrolled HTN with superimposed pre eclampsia, type 1 DM, previous c section    PostOp Diagnosis: same    Procedure(s):  REPEAT C SECTION    Surgeon(s):  GLENN Bee CNM    Anesthesiologist/Type of Anesthesia:  Anesthesiologist: (Unknown)/Spinal    Surgical Staff:  Circulator: (Unknown)  Scrub Person: (Unknown)    Specimens removed if any:  * No specimens in log *    Estimated Blood Loss: 400 cc    Findings: male presentation vertex APGAR pending/pending  Uterus Normal, Tubes Normal, ovaries Normal      Complications: none        2018 11:56 PM Miriam Quinones M.D.

## 2018-07-07 NOTE — PROGRESS NOTES
Progress Note    Fatou Byrnes Martha   Post-op day 1  Chief Admitting Dx: Pregnancy  Indication for care in labor or delivery  severe and controlled hypertension  Delivery Type:  for repeat      Subjective:  Ambulating: no  Tolerating oral feed: yes  Flatus: no    Voiding: no  Dizziness: no  Vitals:    18 0045 18 0100 18 0228 18 0558   BP: 104/59 (!) 96/53 116/67 116/60   Pulse: 81 78 80 90   Resp: 18 18     Temp:       TempSrc:       Weight:       Height:           Exam:  Abdomen: Abdomen soft, non-tender. BS normal. No masses,  No organomegaly  Incision: covered  Fundus Tenderness: yes  Below umbilicus: yes  Perineum: perineum intact  Extremities: Normal  Lochia: mild    Labs:   Recent Results (from the past 24 hour(s))   Hold Blood Bank Specimen (Not Tested)    Collection Time: 18 10:41 PM   Result Value Ref Range    Holding Tube - Bb DONE    CBC with Differential    Collection Time: 18 10:43 PM   Result Value Ref Range    WBC 10.3 4.8 - 10.8 K/uL    RBC 3.83 (L) 4.20 - 5.40 M/uL    Hemoglobin 12.5 12.0 - 16.0 g/dL    Hematocrit 36.7 (L) 37.0 - 47.0 %    MCV 95.8 81.4 - 97.8 fL    MCH 32.6 27.0 - 33.0 pg    MCHC 34.1 33.6 - 35.0 g/dL    RDW 47.9 35.9 - 50.0 fL    Platelet Count 229 164 - 446 K/uL    MPV 11.4 9.0 - 12.9 fL    Neutrophils-Polys 73.20 (H) 44.00 - 72.00 %    Lymphocytes 17.50 (L) 22.00 - 41.00 %    Monocytes 6.60 0.00 - 13.40 %    Eosinophils 1.10 0.00 - 6.90 %    Basophils 0.90 0.00 - 1.80 %    Immature Granulocytes 0.70 0.00 - 0.90 %    Nucleated RBC 0.00 /100 WBC    Neutrophils (Absolute) 7.57 (H) 2.00 - 7.15 K/uL    Lymphs (Absolute) 1.81 1.00 - 4.80 K/uL    Monos (Absolute) 0.68 0.00 - 0.85 K/uL    Eos (Absolute) 0.11 0.00 - 0.51 K/uL    Baso (Absolute) 0.09 0.00 - 0.12 K/uL    Immature Granulocytes (abs) 0.07 0.00 - 0.11 K/uL    NRBC (Absolute) 0.00 K/uL       Assessment:  Continue Routine postpartum care      Plan:  Advance Diet, Encourange  Ambulation and D/C anel Quinones M.D.

## 2018-07-07 NOTE — PROGRESS NOTES
"0730 Report rcvd from GAIL Jeffries, at bedside. POC discussed, pt care assumed. Pt found to be lying in bed. Pain reported as \"tolerable\" at this time. [id number] with scant lochia. VSS.  0900 Pt dangled at bedside. Peripads and underpants applied. Pt ambulated with RN standby assistance to wheelchair. Pt escorted to NICU to see her baby.   1155 Pt returned from NICU. Requests pain meds when available. Requests Urinary Catheter removed, done. Pt tolerated well.   1200 Dr. Elmer Durant updated with BPs. Pt requests toradol to help with general body aches. Orders rcvd.   1300 Pt napping.   1400 Pt taken via wheelchair down to NICU to feed baby Glenn.   1645 Pt transferred to S229 via wheelchair. Report to GAIL Dunbar, at bedside.   "

## 2018-07-07 NOTE — PROGRESS NOTES
Good day pt happy to know when her C/S is dona.  here visiting no needs at this time settled in for the NOC will report off to Noc shift RN at 1900. Pt care cont.

## 2018-07-07 NOTE — OP REPORT
DATE OF SERVICE:  2018    PREOPERATIVE DIAGNOSES:  Intrauterine pregnancy at 34 weeks and 2/7th days   with superimposed preeclampsia, previous  x1 and type 1 diabetes.    POSTOPERATIVE DIAGNOSES:  Intrauterine pregnancy at 34 weeks and 2/7th days   with superimposed preeclampsia, previous  x1 and type 1 diabetes.    PROCEDURE PERFORMED:  Repeat low transverse .    SURGEON:  Miriam Quinones MD    ASSISTANT:  Stephanie Aguillon, certified nurse midwife.    ESTIMATED BLOOD LOSS:  400 mL.    ANESTHESIA:  Spinal epidural provided by Dr. Magdaleno.    INTRAOPERATIVE FINDINGS:  A male infant, vertex presentation, Apgars are   pending, weight 5 pounds 0 ounces.  Normal uterus, tubes, and ovaries.    PROCEDURE IN DETAIL:  This patient has been admitted for greater than 1 week   and was admitted with uncontrolled hypertension.  Patient's blood pressure   medications were titrated up and she ultimately received 400 mg of labetalol   twice daily and 500 mg of Aldomet 3 times a day while inpatient on bed rest.    The patient's blood pressures became increasingly labile finally this evening   having a blood pressure of 209/107.  Patient has received multiple doses of IV   hydralazine as well as labetalol in an attempt to control her blood   pressures.  At this point, the patient started to develop a headache and   decision was made to proceed with repeat .  The patient was taken to   the operating room where spinal anesthesia was then found to be adequate.  She   was then prepped and draped in the usual sterile fashion.  A Pfannenstiel   skin incision made over previous incision, carried down to the underlying   layer of fascia with the knife.  The fascia was then nicked in the midline,   elevated the fascial incision, extended laterally on both sides with Edwards   scissors.  Rectus muscles were then dissected off the posterior aspect of the   fascia both superiorly and inferiorly, they were   in the midline.    Peritoneum was then elevated between 2 hemostats, entered sharply with   Metzenbaum scissors.  The peritoneal incision then extended superiorly and   inferiorly with good visualization of bowel and bladder.  Vesicouterine   peritoneum was identified, a bladder flap created.  The uterus incised in a   low transverse fashion and extended with bandage scissors.  The male infant   was then delivered through the incision without difficulty.  The cord was then   clamped x2 and cut and the infant handed to awaiting NICU staff.  The   placenta delivered manually.  The uterus exteriorized, cleared of all clots   and debris with a moist lap sponge and closed with #1 chromic in a running   lock fashion.  Once hemostasis was found be adequate at the hysterotomy, the   uterus then returned to the peritoneal cavity.  The abdomen was then   irrigated, cleared of all clots and debris with moist lap sponge.  Hemostasis   once again checked at the uterine incision and at that point, the peritoneum   was closed with 0 Vicryl.  The muscles were reapproximated with a Vicryl   stitch.  The fascia closed with 0 Vicryl in a running fashion.  The   subcutaneous layers were checked for hemostasis, which was noted to be   adequate.  The skin was then closed with a 4-0 Vicryl in a subcuticular   fashion and a Prineo wound closure device was placed over top of that.  The   sponge, lap, needle counts were all correct x2 and the patient was taken to   the recovery room in good condition.       ____________________________________     MD RACHEL VICTOR / TAMIR    DD:  07/07/2018 00:02:37  DT:  07/07/2018 00:23:07    D#:  [id number]  Job#:  [id number]

## 2018-07-08 PROCEDURE — 700102 HCHG RX REV CODE 250 W/ 637 OVERRIDE(OP): Performed by: PHYSICIAN ASSISTANT

## 2018-07-08 PROCEDURE — 700111 HCHG RX REV CODE 636 W/ 250 OVERRIDE (IP): Performed by: OBSTETRICS & GYNECOLOGY

## 2018-07-08 PROCEDURE — 770002 HCHG ROOM/CARE - OB PRIVATE (112)

## 2018-07-08 PROCEDURE — A9270 NON-COVERED ITEM OR SERVICE: HCPCS | Performed by: PHYSICIAN ASSISTANT

## 2018-07-08 PROCEDURE — A9270 NON-COVERED ITEM OR SERVICE: HCPCS | Performed by: OBSTETRICS & GYNECOLOGY

## 2018-07-08 PROCEDURE — 700102 HCHG RX REV CODE 250 W/ 637 OVERRIDE(OP): Performed by: OBSTETRICS & GYNECOLOGY

## 2018-07-08 RX ORDER — IBUPROFEN 600 MG/1
600 TABLET ORAL EVERY 6 HOURS PRN
Status: DISCONTINUED | OUTPATIENT
Start: 2018-07-08 | End: 2018-07-10 | Stop reason: HOSPADM

## 2018-07-08 RX ADMIN — IBUPROFEN 600 MG: 600 TABLET, FILM COATED ORAL at 12:01

## 2018-07-08 RX ADMIN — OXYCODONE HYDROCHLORIDE 10 MG: 10 TABLET ORAL at 05:53

## 2018-07-08 RX ADMIN — OXYCODONE HYDROCHLORIDE 10 MG: 10 TABLET ORAL at 17:55

## 2018-07-08 RX ADMIN — IBUPROFEN 600 MG: 600 TABLET, FILM COATED ORAL at 23:46

## 2018-07-08 RX ADMIN — OXYCODONE HYDROCHLORIDE 10 MG: 10 TABLET ORAL at 13:44

## 2018-07-08 RX ADMIN — KETOROLAC TROMETHAMINE 30 MG: 30 INJECTION, SOLUTION INTRAMUSCULAR at 06:33

## 2018-07-08 RX ADMIN — KETOROLAC TROMETHAMINE 30 MG: 30 INJECTION, SOLUTION INTRAMUSCULAR at 00:29

## 2018-07-08 RX ADMIN — METHYLDOPA 500 MG: 500 TABLET ORAL at 19:44

## 2018-07-08 RX ADMIN — OXYCODONE HYDROCHLORIDE 10 MG: 10 TABLET ORAL at 21:58

## 2018-07-08 RX ADMIN — OXYCODONE HYDROCHLORIDE 10 MG: 10 TABLET ORAL at 01:41

## 2018-07-08 RX ADMIN — IBUPROFEN 600 MG: 600 TABLET, FILM COATED ORAL at 17:54

## 2018-07-08 RX ADMIN — Medication 1 TABLET: at 09:38

## 2018-07-08 RX ADMIN — OXYCODONE HYDROCHLORIDE 10 MG: 10 TABLET ORAL at 09:38

## 2018-07-08 ASSESSMENT — PAIN SCALES - GENERAL
PAINLEVEL_OUTOF10: 8
PAINLEVEL_OUTOF10: 5
PAINLEVEL_OUTOF10: 7
PAINLEVEL_OUTOF10: 4
PAINLEVEL_OUTOF10: 5
PAINLEVEL_OUTOF10: 7
PAINLEVEL_OUTOF10: 4
PAINLEVEL_OUTOF10: 8
PAINLEVEL_OUTOF10: 3
PAINLEVEL_OUTOF10: 7
PAINLEVEL_OUTOF10: 7
PAINLEVEL_OUTOF10: 6
PAINLEVEL_OUTOF10: 3

## 2018-07-08 NOTE — PROGRESS NOTES
Assessment complete. Fundus firm, lochia light. Discussed POC with patient. All questions and concerns addressed at this time.

## 2018-07-08 NOTE — CONSULTS
Evaluated breast pump use to include frequency, duration, suction and speed settings as well as flange fit.Progression to breastfeeding discussed with mother. Outlined the supportive measures that should be in place for now, to include pumping strategies, hand expression and intrinsic factors (smell, touch, sight and visualization). Provided Lactation  phone number for her to leave us messages while baby is hospitalized.Offered the use of a nipple shield at the breast but mother declines to try that at this time.

## 2018-07-08 NOTE — PROGRESS NOTES
Post Partum Progress Note    Name:   Fatou Thomas   Date/Time:  2018 - 7:03 AM  Chief Admitting Dx:  Pregnancy  Indication for care in labor or delivery  severe and controlled hypertension  Delivery Type:   for repeat, preeclampsia, IDDM  Post-Op/Post Partum Days #:  2    Subjective:  Abdominal pain: no  Ambulating:   yes  Tolerating liquids:  yes  Tolerating food:  yes common adult  Flatus:   yes  BM:    no  Bleeding:   with a small amount of bleeding  Voiding:   yes  Dizziness:   no  Feeding:   breast    Vitals:    18 1820 18 2120 18 0147 18 0553   BP: 131/84 147/84 110/74 115/79   Pulse: 82 90  98   Resp: 20 20     Temp: 36.7 °C (98 °F) 36.2 °C (97.2 °F)     TempSrc:       SpO2: 96% 97%     Weight:       Height:           Exam:  Breast: Tenderness no and Engorged no  Abdomen: Abdomen soft, non-tender. BS normal. No masses,  No organomegaly  Fundal Tenderness:  no  Fundus Firm: yes  Incision: no evidence of infection, separation or keloid formation.  Below umbilicus: yes  Perineum: perineum intact  Lochia: mild  Extremities: trace extremities, peripheral pulses and reflexes normal    Meds:  Current Facility-Administered Medications   Medication Dose   • ibuprofen (MOTRIN) tablet 600 mg  600 mg   • oxytocin (PITOCIN) infusion (for postpartum)   mL/hr   • LR infusion     • PRN oxytocin (PITOCIN) (20 Units/1000 mL) PRN for excessive uterine bleeding - See Admin Instr  125-999 mL/hr   • miSOPROStol (CYTOTEC) tablet 600 mcg  600 mcg   • carboPROST (HEMABATE) injection 250 mcg  250 mcg   • docusate sodium (COLACE) capsule 100 mg  100 mg   • prenatal plus vitamin (STUARTNATAL 1+1) 27-1 MG tablet 1 Tab  1 Tab   • oxyCODONE immediate-release (ROXICODONE) tablet 5 mg  5 mg   • oxyCODONE immediate release (ROXICODONE) tablet 10 mg  10 mg   • ondansetron (ZOFRAN) syringe/vial injection 4 mg  4 mg    Or   • ondansetron (ZOFRAN ODT) dispertab 4 mg  4 mg   • diphenhydrAMINE  (BENADRYL) tablet/capsule 25 mg  25 mg    Or   • diphenhydrAMINE (BENADRYL) injection 25 mg  25 mg   • morphine (pf) 4 mg/ml injection 4 mg  4 mg   • ketorolac (TORADOL) injection 30 mg  30 mg   • glucose 4 g chewable tablet 16 g  16 g    And   • dextrose 50% (D50W) injection 25 mL  25 mL   • methyldopa (ALDOMET) tablet 500 mg  500 mg   • labetalol (NORMODYNE) tablet 400 mg  400 mg       Labs:   Recent Labs      18   2243  18   0922   WBC  10.3  13.9*   RBC  3.83*  3.30*   HEMOGLOBIN  12.5  10.9*   HEMATOCRIT  36.7*  32.1*   MCV  95.8  97.3   MCH  32.6  33.0   MCHC  34.1  34.0   RDW  47.9  48.4   PLATELETCT  229  199   MPV  11.4  11.8       Assessment:  Chief Admitting Dx:  Pregnancy  Indication for care in labor or delivery  severe and controlled hypertension  Delivery Type:   for repeat  Tubal Ligation:  no    Plan:  Continue routine post partum care. Advance care, encourage ambulation, pain control, anticipate D/C home tomorrow, POD#3.     KAROL Flanagan.

## 2018-07-08 NOTE — PROGRESS NOTES
Patient arrived via wheelchair with belongings to S329. Report received from Renetta BAZAN RN. Patient oriented to room, call light and skylight.  Assessment done fundus firm, lochia light, and vital signs within defined parameters. Discussed with patient pain medication plan, patient would like pain medications when they are available, will call for medication.  Reviewed plan of care with patient, encouraged to call with needs. Hourly rounding implemented, call light within reach.

## 2018-07-08 NOTE — CARE PLAN
Problem: Potential for postpartum infection related to surgical incision, compromised uterine condition, urinary tract or respiratory compromise  Goal: Patient will be afebrile and free from signs and symptoms of infection  No s/s of infection    Problem: Alteration in comfort related to surgical incision and/or after birth pains  Goal: Patient is able to ambulate, care for self and infant with acceptable pain level  Patient is able to ambulate and care for self

## 2018-07-08 NOTE — PROGRESS NOTES
0710-Report received at bedside from Citlalli RN, assumed care of patient.  Encouraged to call with needs, denies at this time.   0900-Assessment completed, fundus is firm, lochia light and vital signs within defined parameters. Patient is ambulating and voiding without difficulty.  Discussed with patient pain medication plan, patient requesting to be medicated PRN, will call for medication.  Plan of care discussed, patient verbalized understanding. Hourly rounding implemented, call light within reach.

## 2018-07-09 PROCEDURE — A9270 NON-COVERED ITEM OR SERVICE: HCPCS | Performed by: OBSTETRICS & GYNECOLOGY

## 2018-07-09 PROCEDURE — 700102 HCHG RX REV CODE 250 W/ 637 OVERRIDE(OP): Performed by: OBSTETRICS & GYNECOLOGY

## 2018-07-09 PROCEDURE — 700102 HCHG RX REV CODE 250 W/ 637 OVERRIDE(OP): Performed by: PHYSICIAN ASSISTANT

## 2018-07-09 PROCEDURE — 770002 HCHG ROOM/CARE - OB PRIVATE (112)

## 2018-07-09 PROCEDURE — A9270 NON-COVERED ITEM OR SERVICE: HCPCS | Performed by: PHYSICIAN ASSISTANT

## 2018-07-09 RX ADMIN — OXYCODONE HYDROCHLORIDE 10 MG: 10 TABLET ORAL at 22:45

## 2018-07-09 RX ADMIN — LABETALOL HYDROCHLORIDE 200 MG: 100 TABLET, FILM COATED ORAL at 16:46

## 2018-07-09 RX ADMIN — OXYCODONE HYDROCHLORIDE 10 MG: 10 TABLET ORAL at 01:52

## 2018-07-09 RX ADMIN — IBUPROFEN 600 MG: 600 TABLET, FILM COATED ORAL at 12:05

## 2018-07-09 RX ADMIN — OXYCODONE HYDROCHLORIDE 5 MG: 5 TABLET ORAL at 10:02

## 2018-07-09 RX ADMIN — OXYCODONE HYDROCHLORIDE 10 MG: 10 TABLET ORAL at 13:50

## 2018-07-09 RX ADMIN — IBUPROFEN 600 MG: 600 TABLET, FILM COATED ORAL at 23:45

## 2018-07-09 RX ADMIN — OXYCODONE HYDROCHLORIDE 10 MG: 10 TABLET ORAL at 06:04

## 2018-07-09 RX ADMIN — IBUPROFEN 600 MG: 600 TABLET, FILM COATED ORAL at 17:54

## 2018-07-09 RX ADMIN — IBUPROFEN 600 MG: 600 TABLET, FILM COATED ORAL at 06:04

## 2018-07-09 RX ADMIN — Medication 1 TABLET: at 10:02

## 2018-07-09 RX ADMIN — OXYCODONE HYDROCHLORIDE 10 MG: 10 TABLET ORAL at 17:54

## 2018-07-09 ASSESSMENT — PAIN SCALES - GENERAL
PAINLEVEL_OUTOF10: 4
PAINLEVEL_OUTOF10: 6
PAINLEVEL_OUTOF10: 5
PAINLEVEL_OUTOF10: 6
PAINLEVEL_OUTOF10: 7
PAINLEVEL_OUTOF10: 7
PAINLEVEL_OUTOF10: 4
PAINLEVEL_OUTOF10: 4
PAINLEVEL_OUTOF10: 6
PAINLEVEL_OUTOF10: 6
PAINLEVEL_OUTOF10: 4
PAINLEVEL_OUTOF10: 6

## 2018-07-09 NOTE — CARE PLAN
Problem: Safety  Goal: Will remain free from injury  Pt educated to use the call light when needing assistance, pt confirmed understanding.     Problem: Pain Management  Goal: Pain level will decrease to patient's comfort goal  PRN pain medication will be given for pain management, RN will continue to assess pts pain levels throughout the shift.

## 2018-07-09 NOTE — DISCHARGE PLANNING
Action: LSW met with POB during rounds. MOB requested information on [facility]. LSW gave copy to nurse to give to MOB.    Plan: No discharge needs at this time. LSW to be available and will follow as needed.

## 2018-07-09 NOTE — PROGRESS NOTES
POSTOPERATIVE  SECTION PROGRESS NOTE;        PATIENT ID:  NAME:  Fatou Thomas  MRN:               [id number]  YOB: 1985     32 y.o. female  at 34w5d POD#2 s/p repeat  section      Subjective: Doing well without complaints    Objective:    Vitals:    18 1600 18 2000 18 0000 18 0150   BP: 139/93 144/92 150/93 116/80   Pulse: (!) 106 100 96    Resp:  16    Temp: 36.8 °C (98.3 °F) 36.9 °C (98.5 °F) 36.7 °C (98.1 °F)    TempSrc:       SpO2: 96% 96% 95%    Weight:       Height:         General: No acute distress, resting comfortably in bed.  HEENT: normocephalic, nontraumatic, PERRLA, EOMI  Cardiovascular: Heart RRR with no murmurs, rubs or gallops. Distal Pulses 2+  Respiratory: symmetric chest expansion, lungs CTA bilaterally with no wheezes rales or rhonci  Abdomen: soft, mildly tender around incision which is clean, dry and intact, fundus firm, +BS  Genitourinary: lochia light, denies excessive vaginal bleeding  Musculoskeletal: strength 5/5 in four extremities  Neuro: non focal with no numbness, tingling or changes in sensation      Recent Labs      18   2243  18   0922   WBC  10.3  13.9*   RBC  3.83*  3.30*   HEMOGLOBIN  12.5  10.9*   HEMATOCRIT  36.7*  32.1*   MCV  95.8  97.3   MCH  32.6  33.0   RDW  47.9  48.4   PLATELETCT  229  199   MPV  11.4  11.8   NEUTSPOLYS  73.20*   --    LYMPHOCYTES  17.50*   --    MONOCYTES  6.60   --    EOSINOPHILS  1.10   --    BASOPHILS  0.90   --      Recent Labs      18   0922   SODIUM  133*   POTASSIUM  4.0   CHLORIDE  105   CO2  23   GLUCOSE  144*   BUN  12       Current Meds:   Current Facility-Administered Medications   Medication Dose Frequency Provider Last Rate Last Dose   • ibuprofen (MOTRIN) tablet 600 mg  600 mg Q6HRS PRN Tamsen ARDEN Santiago, P.A.   600 mg at 18 2346   • oxytocin (PITOCIN) infusion (for postpartum)   mL/hr Continuous Miriam Quinones M.D.       • LR infusion   PRN  Miriam Quinones M.D.       • PRN oxytocin (PITOCIN) (20 Units/1000 mL) PRN for excessive uterine bleeding - See Admin Instr  125-999 mL/hr Once PRN Miriam Quinones M.D.       • miSOPROStol (CYTOTEC) tablet 600 mcg  600 mcg Once PRN Miriam Quinones M.D.       • carboPROST (HEMABATE) injection 250 mcg  250 mcg Once PRN Miriam Quinones M.D.       • docusate sodium (COLACE) capsule 100 mg  100 mg BID PRN Miriam Quinones M.D.       • prenatal plus vitamin (STUARTNATAL 1+1) 27-1 MG tablet 1 Tab  1 Tab QAM Miriam Quinones M.D.   1 Tab at 18 0938   • oxyCODONE immediate-release (ROXICODONE) tablet 5 mg  5 mg Q4HRS PRHA Quinones M.D.       • oxyCODONE immediate release (ROXICODONE) tablet 10 mg  10 mg Q4HRS PRN Miriam Quinones M.D.   10 mg at 18 0152   • ondansetron (ZOFRAN) syringe/vial injection 4 mg  4 mg Q6HRS PRN Miriam Quinones M.D.        Or   • ondansetron (ZOFRAN ODT) dispertab 4 mg  4 mg Q6HRS PRHA Quinones M.D.       • diphenhydrAMINE (BENADRYL) tablet/capsule 25 mg  25 mg Q6HRS PRHA Quinones M.D.        Or   • diphenhydrAMINE (BENADRYL) injection 25 mg  25 mg Q6HRS PRN Miriam Quinones M.D.       • morphine (pf) 4 mg/ml injection 4 mg  4 mg Q3HRS PRN Miriam Quinones M.D.   4 mg at 18 0903   • ketorolac (TORADOL) injection 30 mg  30 mg Q6HRS PRN Carleen Deras M.D.   30 mg at 18 0633   • glucose 4 g chewable tablet 16 g  16 g Q15 MIN PRN Miriam Quinones M.D.        And   • dextrose 50% (D50W) injection 25 mL  25 mL Q15 MIN PRN Miriam Quinones M.D.       • methyldopa (ALDOMET) tablet 500 mg  500 mg Q6HRS Joycelyn Chauhan M.D.   Stopped at 18 0200   • labetalol (NORMODYNE) tablet 400 mg  400 mg Q12HR Joycelyn Chauhan M.D.   Stopped at 18 0600     Last reviewed on 2018 12:16 PM by Ml Sepulveda M.D.        Assessment:  32 y.o. female  at 34w5d POD#2 s/p repeat  section for gestational  hypertension    Plan:   1. Patient has stable blood pressures on Aldomet and labetalol-continue present dosage  2. Disposition: As indicated    Marlon Gu MD

## 2018-07-09 NOTE — PROGRESS NOTES
"Lactation Note:    MOB stated pumping is going well.  She is receiving 60 ml of breast milk from each pumping session.  Assistance offered, but MOB declined.  MOB denied pain with pumping.  Breast pump suction setting reviewed with MOB and is set to comfort at 60-65%- MOB stated is aware of the recommendation of 30%, but stated \"I've done this before and I know what I am doing.\" MOB stated she feels comfortable using breast pump and has no questions and/or concerns at this time.  MOB stated she will go down to the [facility] to see if her insurance covers the rental of a hospital grade breast pump. MOB encouraged to call for lactation assistance as needed.  "

## 2018-07-10 VITALS
HEART RATE: 90 BPM | HEIGHT: 61 IN | RESPIRATION RATE: 18 BRPM | WEIGHT: 148 LBS | DIASTOLIC BLOOD PRESSURE: 99 MMHG | OXYGEN SATURATION: 94 % | BODY MASS INDEX: 27.94 KG/M2 | SYSTOLIC BLOOD PRESSURE: 149 MMHG | TEMPERATURE: 98.4 F

## 2018-07-10 PROCEDURE — 700102 HCHG RX REV CODE 250 W/ 637 OVERRIDE(OP): Performed by: OBSTETRICS & GYNECOLOGY

## 2018-07-10 PROCEDURE — A9270 NON-COVERED ITEM OR SERVICE: HCPCS | Performed by: PHYSICIAN ASSISTANT

## 2018-07-10 PROCEDURE — 700102 HCHG RX REV CODE 250 W/ 637 OVERRIDE(OP): Performed by: STUDENT IN AN ORGANIZED HEALTH CARE EDUCATION/TRAINING PROGRAM

## 2018-07-10 PROCEDURE — 700102 HCHG RX REV CODE 250 W/ 637 OVERRIDE(OP): Performed by: NURSE PRACTITIONER

## 2018-07-10 PROCEDURE — A9270 NON-COVERED ITEM OR SERVICE: HCPCS | Performed by: OBSTETRICS & GYNECOLOGY

## 2018-07-10 PROCEDURE — A9270 NON-COVERED ITEM OR SERVICE: HCPCS | Performed by: NURSE PRACTITIONER

## 2018-07-10 PROCEDURE — 700102 HCHG RX REV CODE 250 W/ 637 OVERRIDE(OP): Performed by: PHYSICIAN ASSISTANT

## 2018-07-10 PROCEDURE — A9270 NON-COVERED ITEM OR SERVICE: HCPCS | Performed by: STUDENT IN AN ORGANIZED HEALTH CARE EDUCATION/TRAINING PROGRAM

## 2018-07-10 RX ORDER — LABETALOL 200 MG/1
TABLET, FILM COATED ORAL
Qty: 120 TAB | Refills: 1 | Status: SHIPPED | OUTPATIENT
Start: 2018-07-10 | End: 2018-07-18

## 2018-07-10 RX ORDER — IBUPROFEN 800 MG/1
800 TABLET ORAL EVERY 8 HOURS PRN
Qty: 50 TAB | Refills: 2 | Status: SHIPPED | OUTPATIENT
Start: 2018-07-10

## 2018-07-10 RX ORDER — LABETALOL 100 MG/1
200 TABLET, FILM COATED ORAL
Status: COMPLETED | OUTPATIENT
Start: 2018-07-10 | End: 2018-07-10

## 2018-07-10 RX ORDER — IBUPROFEN 600 MG/1
600 TABLET ORAL EVERY 6 HOURS PRN
Qty: 30 TAB | Refills: 1 | Status: SHIPPED | OUTPATIENT
Start: 2018-07-10 | End: 2018-07-18

## 2018-07-10 RX ORDER — OXYCODONE HYDROCHLORIDE AND ACETAMINOPHEN 5; 325 MG/1; MG/1
1-2 TABLET ORAL EVERY 4 HOURS PRN
Qty: 40 TAB | Refills: 0 | Status: SHIPPED | OUTPATIENT
Start: 2018-07-10 | End: 2018-07-17

## 2018-07-10 RX ORDER — BENZOCAINE/MENTHOL 6 MG-10 MG
LOZENGE MUCOUS MEMBRANE 2 TIMES DAILY
Status: DISCONTINUED | OUTPATIENT
Start: 2018-07-10 | End: 2018-07-10 | Stop reason: HOSPADM

## 2018-07-10 RX ORDER — OXYCODONE HYDROCHLORIDE AND ACETAMINOPHEN 5; 325 MG/1; MG/1
1-2 TABLET ORAL EVERY 4 HOURS PRN
Qty: 40 TAB | Refills: 0 | Status: SHIPPED | OUTPATIENT
Start: 2018-07-10 | End: 2018-07-10

## 2018-07-10 RX ORDER — OXYCODONE HYDROCHLORIDE 5 MG/1
5 TABLET ORAL EVERY 4 HOURS PRN
Qty: 20 TAB | Refills: 0 | Status: SHIPPED | OUTPATIENT
Start: 2018-07-10 | End: 2018-07-10

## 2018-07-10 RX ADMIN — METHYLDOPA 500 MG: 500 TABLET ORAL at 09:18

## 2018-07-10 RX ADMIN — Medication 1 TABLET: at 09:18

## 2018-07-10 RX ADMIN — OXYCODONE HYDROCHLORIDE 10 MG: 10 TABLET ORAL at 07:24

## 2018-07-10 RX ADMIN — IBUPROFEN 600 MG: 600 TABLET, FILM COATED ORAL at 07:24

## 2018-07-10 RX ADMIN — METHYLDOPA 500 MG: 500 TABLET ORAL at 14:44

## 2018-07-10 RX ADMIN — OXYCODONE HYDROCHLORIDE 10 MG: 10 TABLET ORAL at 02:51

## 2018-07-10 RX ADMIN — OXYCODONE HYDROCHLORIDE 10 MG: 10 TABLET ORAL at 14:45

## 2018-07-10 RX ADMIN — HYDROCORTISONE: 1 CREAM TOPICAL at 09:22

## 2018-07-10 RX ADMIN — LABETALOL HYDROCHLORIDE 200 MG: 100 TABLET, FILM COATED ORAL at 06:08

## 2018-07-10 RX ADMIN — IBUPROFEN 600 MG: 600 TABLET, FILM COATED ORAL at 13:34

## 2018-07-10 RX ADMIN — OXYCODONE HYDROCHLORIDE 10 MG: 10 TABLET ORAL at 10:48

## 2018-07-10 RX ADMIN — LABETALOL HYDROCHLORIDE 200 MG: 100 TABLET, FILM COATED ORAL at 13:35

## 2018-07-10 ASSESSMENT — PAIN SCALES - GENERAL
PAINLEVEL_OUTOF10: 5
PAINLEVEL_OUTOF10: 4
PAINLEVEL_OUTOF10: 7
PAINLEVEL_OUTOF10: 4
PAINLEVEL_OUTOF10: 8
PAINLEVEL_OUTOF10: 7

## 2018-07-10 NOTE — CARE PLAN
Problem: Altered physiologic condition related to postoperative  delivery  Goal: Patient physiologically stable as evidenced by normal lochia, palpable uterine involution and vital signs within normal limits  Outcome: PROGRESSING AS EXPECTED  Fundus firm, lochia light, VSS.    Problem: Alteration in comfort related to surgical incision and/or after birth pains  Goal: Patient is able to ambulate, care for self and infant with acceptable pain level  Outcome: PROGRESSING AS EXPECTED  Pt is ambulating to NICU to visit infant, caring for self independently, and taking po pain medications as needed.

## 2018-07-10 NOTE — PROGRESS NOTES
Lactation Note:    MOB continues to deny need for assistance from Lactation with pumping.      Pump settings reviewed and are: 80 CPM down to 60 CPM after 1-2 minutes or when milk flow is observed/suction set to comfort at 60-65%/pumps for 15 minutes.  MOB denied pain with pumping.    MOB stated transitional milk has come in.    Encouraged MOB to call for lactation assistance as needed.

## 2018-07-10 NOTE — PROGRESS NOTES
1900- Bedside report received, assumed care of patient.  Pt sitting up at table eating, showing no s/s of distress.  Pt reports she will be going to NICU soon to visit infant, denies needs at this time.  2100- Pt assessment complete, wnl.  Pt ambulating and voiding without difficulty, states positive flatus.  Bonding well with infant, using electric breast pump efficiently.  Plan of care discussed with patient for the night.  Questions answered.  FOB at bedside and supportive.  Encouraged to call with needs.  2245- Pt medicated for pain, see MAR.    2300- Pt to NICU with FOB.  2345- Pt back to room, medicated for pain.  Pt pumping.  POC discussed.  Denies other needs.

## 2018-07-10 NOTE — CARE PLAN
Problem: Altered physiologic condition related to postoperative  delivery  Goal: Patient physiologically stable as evidenced by normal lochia, palpable uterine involution and vital signs within normal limits  Outcome: PROGRESSING AS EXPECTED  Assessment WNL. VSS.     Problem: Alteration in comfort related to surgical incision and/or after birth pains  Goal: Patient is able to ambulate, care for self and infant with acceptable pain level  Outcome: PROGRESSING AS EXPECTED  Pt cites adequate relief of pain with pain medication provided. Pt will call if she needs pain medication. Pt verbalizes understanding.

## 2018-07-10 NOTE — PROGRESS NOTES
Pt assessed. Pt encouraged to call with needs. Reviewed plan of care. Pt bonding well with infant in the NICU.

## 2018-07-10 NOTE — DISCHARGE INSTRUCTIONS
POSTPARTUM DISCHARGE INSTRUCTIONS FOR MOM    YOB: 1985   Age: 32 y.o.               Admit Date: 2018     Discharge Date: 7/10/2018  Attending Doctor:  Marlon Gu M.D.                  Allergies:  Patient has no known allergies.    Discharged to other by car. Discharged via wheelchair, hospital escort: Yes.  Special equipment needed: Not Applicable  Belongings with: Personal  Be sure to schedule a follow-up appointment with your primary care doctor or any specialists as instructed.     Discharge Plan:   Diet Plan: Discussed  Activity Level: Discussed  Confirmed Follow up Appointment: Patient to Call and Schedule Appointment  Confirmed Symptoms Management: Discussed  Medication Reconciliation Updated: Yes  Influenza Vaccine Indication: Indicated: Not available from distributor/    REASONS TO CALL YOUR OBSTETRICIAN:  1.   Persistent fever or shaking chills (Temperature higher than 100.4)  2.   Heavy bleeding (soaking more than 1 pad per hour); Passing clots  3.   Foul odor from vagina  4.   Mastitis (Breast infection; breast pain, chills, fever, redness)  5.   Urinary pain, burning or frequency  6.   Episiotomy infection  7.   Abdominal incision infection  8.   Severe depression longer than 24 hours    HAND WASHING  · Prior to handling the baby.  · Before breastfeeding or bottle feeding baby.  · After using the bathroom or changing the baby's diaper.    WOUND CARE  Ask your physician for additional care instructions.  In general:    ·  Incision:      · Keep clean and dry.    · Do NOT lift anything heavier than your baby for up to 6 weeks.    · There should not be any opening or pus.      VAGINAL CARE  · Nothing inside vagina for 6 weeks: no sexual intercourse, tampons or douching.  · Bleeding may continue for 2-4 weeks.  Amount may vary.    · Call your physician for heavy bleeding which means soaking more than 1 pad per hour    BIRTH CONTROL  · It is possible to become pregnant  "at any time after delivery and while breastfeeding.  · Plan to discuss a method of birth control with your physician at your follow up visit. visit.    DIET AND ELIMINATION  · Eating more fiber (bran cereal, fruits, and vegetables) and drinking plenty of fluids will help to avoid constipation.  · Urinary frequency after childbirth is normal.    POSTPARTUM BLUES  During the first few days after birth, you may experience a sense of the \"blues\" which may include impatience, irritability or even crying.  These feeling come and go quickly.  However, as many as 1 in 10 women experience emotional symptoms known as postpartum depression.    Postpartum depression:  May start as early as the second or third day after delivery or take several weeks or months to develop.  Symptoms of \"blues\" are present, but are more intense:  Crying spells; loss of appetite; feelings of hopelessness or loss of control; fear of touching the baby; over concern or no concern at all about the baby; little or no concern about your own appearance/caring for yourself; and/or inability to sleep or excessive sleeping.  Contact your physician if you are experiencing any of these symptoms.    Crisis Hotline:  · [place] Crisis Hotline:  8-829-DPSGVYW  Or 1-764.855.4443  · [place] Crisis Hotline:  1-937.268.9465  Or 773-617-7513    PREVENTING SHAKEN BABY:  If you are angry or stressed, PUT THE BABY IN THE CRIB, step away, take some deep breaths, and wait until you are calm to care for the baby.  DO NOT SHAKE THE BABY.  You are not alone, call a supporter for help.    · Crisis Call Center 24/7 crisis line 637-174-9471 or 1-461.549.1502  · You can also text them, text \"ANSWER\" to 332813    QUIT SMOKING/TOBACCO USE:  I understand the use of any tobacco products increases my chance of suffering from future heart disease and could cause other illnesses which may shorten my life. Quitting the use of tobacco products is the single most important thing I can do to " improve my health. For further information on smoking / tobacco cessation call a Toll Free Quit Line at 1-759.601.6290 (*[employer] [place]) or 1-357.911.8327 (American [employer] Association) or you can access the web based program at www.lungusa.org.    · [place] Tobacco Users Help Line:  (563) 979-6430       Toll Free: 1-565.313.2143  · Quit Tobacco Program [facility] (677)079-4335    DEPRESSION / SUICIDE RISK:  As you are discharged from this [facility], it is important to learn how to keep safe from harming yourself.    Recognize the warning signs:  · Abrupt changes in personality, positive or negative- including increase in energy   · Giving away possessions  · Change in eating patterns- significant weight changes-  positive or negative  · Change in sleeping patterns- unable to sleep or sleeping all the time   · Unwillingness or inability to communicate  · Depression  · Unusual sadness, discouragement and loneliness  · Talk of wanting to die  · Neglect of personal appearance   · Rebelliousness- reckless behavior  · Withdrawal from people/activities they love  · Confusion- inability to concentrate     If you or a loved one observes any of these behaviors or has concerns about self-harm, here's what you can do:  · Talk about it- your feelings and reasons for harming yourself  · Remove any means that you might use to hurt yourself (examples: pills, rope, extension cords, firearm)  · Get professional help from the community (Mental Health, Substance Abuse, psychological counseling)  · Do not be alone:Call your Safe Contact- someone whom you trust who will be there for you.  · Call your local CRISIS HOTLINE 502-7424 or 395-892-6666  · Call your local Children's Mobile Crisis Response Team [place] (465) 281-4176 or www.[employer]  · Call the toll free National Suicide Prevention Hotlines   · National Suicide Prevention Lifeline 276-198-BKSN (2960)  · National Hope Line  [facility] 800-SUICIDE (555-9044)    DISCHARGE SURVEY:  Thank you for choosing [facility].  We hope we provided you with very good care.  You may be receiving a survey in the mail.  Please fill it out.  Your opinion is valuable to us.    ADDITIONAL EDUCATIONAL MATERIALS GIVEN TO PATIENT:        My signature on this form indicates that:  1.  I have reviewed and understand the above information  2.  My questions regarding this information have been answered to my satisfaction.  3.  I have formulated a plan with my discharge nurse to obtain my prescribed medication for home.

## 2018-07-10 NOTE — PROGRESS NOTES
Pt discharged to rooming-in room in [place] [address]. Discharge paperwork gone over with pt and medications reviewed. Pt verbalized understanding of instructions and medications. Pt stated that all her questions had been answered.

## 2018-07-10 NOTE — PROGRESS NOTES
Post Partum Progress Note    Name:   Fatou Thomas   Date/Time:  7/10/2018 - 6:57 AM  Chief Admitting Dx:  Pregnancy  Indication for care in labor or delivery  severe and controlled hypertension  Delivery Type:   for repeat, uncontrolled HTN  Post-Op/Post Partum Days #:  3    Subjective:  Abdominal pain: yes  Ambulating:   yes  Tolerating liquids:  yes  Tolerating food:  yes diabetic, 2000 calorie  Flatus:   yes  BM:    yes  Bleeding:   with a small amount of bleeding  Voiding:   yes  Dizziness:   no  Feeding:   Pumping breasts for baby in NICU    Vitals:    18 1754 18 2100 07/10/18 0200 07/10/18 0600   BP: 121/78 136/82 122/85 143/97   Pulse: 90 98 95 99   Resp:     Temp:  36.8 °C (98.3 °F) 36.7 °C (98.1 °F) 36.7 °C (98.1 °F)   TempSrc:       SpO2:  96% 95% 95%   Weight:       Height:           Exam:  Breast: Lactating yes  Abdomen: Abdomen soft, non-tender. BS normal. No masses,  No organomegaly  Fundal Tenderness:  no  Fundus Firm: yes  Incision: dry and intact dressing in place  Below umbilicus: yes  Perineum: perineum intact  Lochia: mild  Extremities: Normal extremities, peripheral pulses and reflexes normal, no edema, redness or tenderness in the calves or thighs    Meds:  Current Facility-Administered Medications   Medication Dose   • ibuprofen (MOTRIN) tablet 600 mg  600 mg   • oxytocin (PITOCIN) infusion (for postpartum)   mL/hr   • LR infusion     • PRN oxytocin (PITOCIN) (20 Units/1000 mL) PRN for excessive uterine bleeding - See Admin Instr  125-999 mL/hr   • miSOPROStol (CYTOTEC) tablet 600 mcg  600 mcg   • carboPROST (HEMABATE) injection 250 mcg  250 mcg   • docusate sodium (COLACE) capsule 100 mg  100 mg   • prenatal plus vitamin (STUARTNATAL 1+1) 27-1 MG tablet 1 Tab  1 Tab   • oxyCODONE immediate-release (ROXICODONE) tablet 5 mg  5 mg   • oxyCODONE immediate release (ROXICODONE) tablet 10 mg  10 mg   • ondansetron (ZOFRAN) syringe/vial injection 4 mg  4  mg    Or   • ondansetron (ZOFRAN ODT) dispertab 4 mg  4 mg   • diphenhydrAMINE (BENADRYL) tablet/capsule 25 mg  25 mg    Or   • diphenhydrAMINE (BENADRYL) injection 25 mg  25 mg   • morphine (pf) 4 mg/ml injection 4 mg  4 mg   • ketorolac (TORADOL) injection 30 mg  30 mg   • glucose 4 g chewable tablet 16 g  16 g    And   • dextrose 50% (D50W) injection 25 mL  25 mL   • methyldopa (ALDOMET) tablet 500 mg  500 mg   • labetalol (NORMODYNE) tablet 400 mg  400 mg       Labs:   Recent Labs      18   0922   WBC  13.9*   RBC  3.30*   HEMOGLOBIN  10.9*   HEMATOCRIT  32.1*   MCV  97.3   MCH  33.0   MCHC  34.0   RDW  48.4   PLATELETCT  199   MPV  11.8       Assessment:  Chief Admitting Dx:  Pregnancy  Indication for care in labor or delivery  severe and controlled hypertension  Delivery Type:   for repeat, uncontrolled HTN  Tubal Ligation:  no    Plan:  Continue routine post partum care.  Encourage breast-pumping and ambulation.   Hydrocortisone to tape rash.      Stephanie Aguillon C.N.M.

## 2018-07-10 NOTE — PROGRESS NOTES
TC to Dr. Jacob. Pt to take Labetalol 400mg bid and to DC [place] for DC meds. Order read back.

## 2018-07-10 NOTE — DISCHARGE SUMMARY
Discharge Summary:      Fatou Thomas      Admit Date:   2018  Discharge Date:  7/10/2018     Admitting diagnosis:  Pregnancy  Indication for care in labor or delivery  severe and controlled hypertension  Discharge Diagnosis: Status post  for repeat, uncontrolled HTN   Pregnancy Complications: Severe hypertension  Tubal Ligation:  no        History:  Past Medical History:   Diagnosis Date   • Diabetes mellitus of mother, complicating pregnancy, childbirth, or the puerperium, unspecified as to episode of care(648.00) 2015   • Encounter for long-term (current) use of insulin ([facility]) 2015   • Type II or unspecified type diabetes mellitus without mention of complication, not stated as uncontrolled 1993     OB History    Para Term  AB Living   2 1 0 1   1   SAB TAB Ectopic Molar Multiple Live Births             1      # Outcome Date GA Lbr Doron/2nd Weight Sex Delivery Anes PTL Lv   2 Current            1  16 35w4d  3.475 kg (7 lb 10.6 oz) F CS-Unspec  N SILVA      Birth Comments: severe preclampsia, failed IOL           Patient has no known allergies.  Patient Active Problem List    Diagnosis Date Noted   • S/P  section 2018     Priority: High   • HTN in pregnancy, chronic 2015     Priority: High   • Postpartum care following  delivery 07/10/2018   • Essential hypertension 2018   • Pregnancy and insulin-dependent diabetes mellitus in second trimester ([facility]) 2018   • Hx of preeclampsia, prior pregnancy, currently pregnant, first trimester 01/10/2018   • High-risk pregnancy, second trimester 2015   • Encounter for long-term (current) use of insulin ([facility]) 2015   • Type 1 diabetes mellitus ([facility]) 2015        Hospital Course:   32 y.o. , now para 2, was admitted with the above mentioned diagnosis, underwent Repeat  uncontrolled hypertension. Patient postpartum course was unremarkable, with progressive  advancement in diet , ambulation and toleration of oral analgesia. Patient without complaints today and desires discharge.      Vitals:    07/10/18 1215 07/10/18 1222 07/10/18 1335 07/10/18 1444   BP: 157/105 158/96 151/112 135/97   Pulse: 70  81 90   Resp: 18      Temp: 36.8 °C (98.3 °F)      TempSrc:       SpO2: 96%      Weight:       Height:           Current Facility-Administered Medications   Medication Dose   • hydrocortisone 1 % cream     • ibuprofen (MOTRIN) tablet 600 mg  600 mg   • oxytocin (PITOCIN) infusion (for postpartum)   mL/hr   • LR infusion     • PRN oxytocin (PITOCIN) (20 Units/1000 mL) PRN for excessive uterine bleeding - See Admin Instr  125-999 mL/hr   • miSOPROStol (CYTOTEC) tablet 600 mcg  600 mcg   • carboPROST (HEMABATE) injection 250 mcg  250 mcg   • docusate sodium (COLACE) capsule 100 mg  100 mg   • prenatal plus vitamin (STUARTNATAL 1+1) 27-1 MG tablet 1 Tab  1 Tab   • oxyCODONE immediate-release (ROXICODONE) tablet 5 mg  5 mg   • oxyCODONE immediate release (ROXICODONE) tablet 10 mg  10 mg   • ondansetron (ZOFRAN) syringe/vial injection 4 mg  4 mg    Or   • ondansetron (ZOFRAN ODT) dispertab 4 mg  4 mg   • diphenhydrAMINE (BENADRYL) tablet/capsule 25 mg  25 mg    Or   • diphenhydrAMINE (BENADRYL) injection 25 mg  25 mg   • morphine (pf) 4 mg/ml injection 4 mg  4 mg   • ketorolac (TORADOL) injection 30 mg  30 mg   • glucose 4 g chewable tablet 16 g  16 g    And   • dextrose 50% (D50W) injection 25 mL  25 mL   • methyldopa (ALDOMET) tablet 500 mg  500 mg   • labetalol (NORMODYNE) tablet 400 mg  400 mg       Exam:  Breast Exam: negative  Abdomen: Abdomen soft, non-tender. BS normal. No masses,  No organomegaly  Fundus Non Tender: no  Incision: dry and intact  Perineum: perineum intact  Extremity: extremities, peripheral pulses and reflexes normal     Labs:         Activity:   Discharge to home  Pelvic Rest x 6 weeks    Assessment:  normal postpartum course  Discharge Assessment: No  areas of skin breakdown/redness; surgical incision intact/healing, No heavy bleeding or foul vaginal discharge , Voiding without difficulty     Follow up: .TPC in 1 week for incision check.   To resume daily PNV and iron supplement if needed with hydration.   Patient to RT TPC or ER if any of the following occur:  Fever over 100.5  Severe abdominal pain  Red streaks or painful masses in the breasts  Foul smelling discharge or lochia  Heavy vaginal bleeding saturating a pad per hour  S/s of PP depression     Discharge Meds:   Current Outpatient Prescriptions   Medication Sig Dispense Refill   • oxyCODONE-acetaminophen (PERCOCET) 5-325 MG Tab Take 1-2 Tabs by mouth every four hours as needed for Moderate Pain or Severe Pain for up to 7 days. 40 Tab 0   • ibuprofen (MOTRIN) 600 MG Tab Take 1 Tab by mouth every 6 hours as needed for Mild Pain. 30 Tab 1       Carleen Escoto M.D.

## 2018-07-18 ENCOUNTER — POST PARTUM (OUTPATIENT)
Dept: OBGYN | Facility: CLINIC | Age: 33
End: 2018-07-18
Payer: COMMERCIAL

## 2018-07-18 VITALS — SYSTOLIC BLOOD PRESSURE: 138 MMHG | BODY MASS INDEX: 25.7 KG/M2 | DIASTOLIC BLOOD PRESSURE: 88 MMHG | WEIGHT: 136 LBS

## 2018-07-18 DIAGNOSIS — I10 ESSENTIAL HYPERTENSION: ICD-10-CM

## 2018-07-18 DIAGNOSIS — Z98.891 S/P CESAREAN SECTION: ICD-10-CM

## 2018-07-18 PROBLEM — O09.291 HX OF PREECLAMPSIA, PRIOR PREGNANCY, CURRENTLY PREGNANT, FIRST TRIMESTER: Status: RESOLVED | Noted: 2018-01-10 | Resolved: 2018-07-18

## 2018-07-18 PROCEDURE — 99024 POSTOP FOLLOW-UP VISIT: CPT | Performed by: OBSTETRICS & GYNECOLOGY

## 2018-07-18 NOTE — PROGRESS NOTES
Fatou Thomas is a 32 y.o. female who presents for her Postpartum Exam      HPI Comments: Pt presents for postpartum exam s/p  for Preeclampsia and repeat on 18. Patient is without complaints.  Baby doing well, still in NICU.  No depression/anxiety.  Not sexually active.  Lochia small  Desires vasectomy for contraception.    Review of Systems:   Pertinent positives documented in HPI and all other systems reviewed & are negative    Last pap: 1/10/18    Physical exam:   Vital measurements:  Blood pressure 138/88, weight 61.7 kg (136 lb), last menstrual period 2017, currently breastfeeding.  Body mass index is 25.7 kg/m². (Goal BMI>18 <25)  Nursing note and vitals reviewed.  Gen: She is oriented to person, place, and time. She appears well-developed and well-nourished. No distress.   Heart: Heart regular rate and rhythm  Lungs: clear to auscultation bilaterally  Breasts: nontender, not engorged, no dominant masses, nipples intact  Abdomen: soft, nontender, nondistended, no rebound/guarding, edema from umbilicus down to incision  Extremities: nontender, no clubbing, cyanosis or edema  Pelvic: deferred  Incision: healing well, clean/dry/intact    A/P: Postpartum status post  for SPreE/repeat  Doing well without complaints  Continue prenatal vitamins if breast feeding  Light activity and pelvic rest until 6wks PP  Contraception vasectomy  Follow up in 1 month for final PP check or sooner as needed

## 2018-08-07 NOTE — H&P
Patient admitted for severe HTN associated with pregnancy  BP continues to be elevated despite two medications proceed with repeat c section

## 2018-08-15 ENCOUNTER — POST PARTUM (OUTPATIENT)
Dept: OBGYN | Facility: CLINIC | Age: 33
End: 2018-08-15
Payer: COMMERCIAL

## 2018-08-15 VITALS
HEIGHT: 61 IN | WEIGHT: 129 LBS | DIASTOLIC BLOOD PRESSURE: 80 MMHG | SYSTOLIC BLOOD PRESSURE: 124 MMHG | BODY MASS INDEX: 24.35 KG/M2

## 2018-08-15 PROCEDURE — 90050 PR POSTPARTUM VISIT: CPT | Performed by: OBSTETRICS & GYNECOLOGY

## 2018-08-16 NOTE — PROGRESS NOTES
"Fatou Thomas is a 33 y.o. female who presents for her Postpartum Exam      HPI Comments: Pt presents for postpartum exam s/p  for PIH and repeat  on 18. Patient is without complaints.  Baby doing well.  Breast/bottle feeding.  No depression/anxiety.  Not sexually active.  Lochia resolved.  Desires Vasectomy for contraception.    Review of Systems:   Pertinent positives documented in HPI and all other systems reviewed & are negative    Last pap: 2018 - WNL    Physical exam:   Vital measurements:  Blood pressure 124/80, height 1.549 m (5' 1\"), weight 58.5 kg (129 lb), currently breastfeeding.  Body mass index is 24.37 kg/m². (Goal BMI>18 <25)  Nursing note and vitals reviewed.  Gen: She is oriented to person, place, and time. She appears well-developed and well-nourished. No distress.   Breasts: deferred  Abdomen: soft, nontender, nondistended, no rebound/guarding  Extremities: nontender, no clubbing, cyanosis or edema  Pelvic: deferred  Incision: healing well, clean/dry/intact      A/P: Postpartum status post  for repeat  Doing well without complaints  Continue prenatal vitamins if breast feeding  May resume normal activities including exercise, tampons, and intercourse  Contraception - desires vasectomy, will schedule  appt with urology  Follow up in 1 year for annual exam or sooner as needed    "

## 2018-09-27 ENCOUNTER — NON-PROVIDER VISIT (OUTPATIENT)
Dept: OCCUPATIONAL MEDICINE | Facility: CLINIC | Age: 33
End: 2018-09-27

## 2018-09-27 DIAGNOSIS — Z23 IMMUNIZATION DUE: ICD-10-CM

## 2018-09-27 PROCEDURE — 90686 IIV4 VACC NO PRSV 0.5 ML IM: CPT | Performed by: PREVENTIVE MEDICINE

## 2018-11-06 NOTE — LETTER
February 22, 2018       Patient: Fatou Thomas   YOB: 1985   Date of Visit: 2/22/2018         To Whom It May Concern:    It is my medical opinion that Fatou Thomas is pregnant with an expected delivery date of 8/15/2018. Her first visit in our clinic for this pregnancy was 1/10/2018.    If you have any questions or concerns, please don't hesitate to call [phone]          Sincerely,          Miriam Quinones M.D.  Electronically Signed      Bilateral T12-L3 Lumbar Medial Branch Blockade  [facility]    PREOPERATIVE DIAGNOSIS:  Lumbar spondylosis without myelopathy    POSTOPERATIVE DIAGNOSIS:  Lumbar spondylosis without myelopathy    PROCEDURE:   Diagnostic Bilateral Lumbar Medial Branch Nerve Blockades, with fluoroscopy:  T12, L1, L2, & L3 nerves (at the L1, L2, L3, L4 transverse processes) to block facet joints L1-2, L2-3, & L3-4  1. 10703-09 -- Bilateral Lumbar Facet blocks, 1st Level  2. 20180-79 -- Bilateral Lumbar Facet blocks, 2nd  Level  3. 59914-61 -- Bilateral Lumbar Facet blocks, 3rd Level    PRE-PROCEDURE DISCUSSION WITH PATIENT:    Risks and complications were discussed with the patient prior to starting the procedure and informed consent was obtained.      SURGEON:  Reji Law MD    REASON FOR PROCEDURE:    Diagnostic injection at this level is needed and Tenderness to palpation of these affected joints is noted on exam under fluoroscopy.      SEDATION:  Versed 8mg & Fentanyl 50 mcg IV  ANESTHETIC:  Marcaine 0.5%  STEROID:  Methylprednisolone (DEPO MEDROL) 80mg/ml  TOTAL VOLUME OF SOLUTION:  8ml    DESCRIPTON OF PROCEDURE:  After obtaining informed consent, IV access was obtained in the preoperative area.   The patient was taken to the operating room.  The patient was placed in the prone position with a pillow under the abdomen. All pressure points were well padded.  EKG, blood pressure, and pulse oximeter were monitored.  The patient was monitored and sedated by the RN under my direction. The lumbosacral area was prepped with Chloraprep and draped in a sterile fashion. Under fluoroscopic guidance the transverse processes of the L1, L2, L3, and L4 vertebrae at the junctions of the superior articular processes were identified on the right.  Skin and subcutaneous tissue were anesthetized with 1% lidocaine above each of these points. A 22-gauge spinal needle was introduced under fluoroscopic guidance at the above  junctions. Aspiration was negative for blood and CSF.  After confirming the position of the needle with fluoroscope in all views, 0.25 mL of Omnipaque was injected, and after seeing the proper spread a total of 1 mL of the anesthetic solution noted above was injected at each of these points.  Needles were removed intact from each of the areas.  A similar procedure was repeated to block the same nerves on the contralateral side.   Onset of analgesia was noted.  Vital signs remained stable throughout.      ESTIMATED BLOOD LOSS:  <5 mL  SPECIMENS:  none    COMPLICATIONS:   No complications were noted., There was no indication of vascular uptake on live injection of contrast dye. and The patient did not have any signs of postprocedure numbness nor weakness.    TOLERANCE & DISCHARGE CONDITION:    The patient tolerated the procedure well.  The patient was transported to the recovery area without difficulties.  The patient was discharged to home under the care of family in stable and satisfactory condition.    PLAN OF CARE:  1. The patient was given our standard instruction sheet.  2. We discussed that Lumbar Medial Branch Blockade is a diagnostic procedure in consideration for radiofrequency ablation if two diagnostic procedures prove to be positive for significant benefit.  If sustained relief of 6 to eight weeks is obtained, then an alternative plan could be therapeutic lumbar branch blockades.  3. The patient is asked to keep a pain log each hour for 8 hours after the procedure today.  4. The patient will  Repeat injection 2 wks.  5. The patient will resume all medications as per the medication reconciliation sheet.

## 2018-12-26 ENCOUNTER — DOCUMENTATION (OUTPATIENT)
Dept: OCCUPATIONAL MEDICINE | Facility: CLINIC | Age: 33
End: 2018-12-26

## 2018-12-26 NOTE — PROGRESS NOTES
Respiratory questionnaire reviewed and signed. See scanned documents.      OK for mask fit event.   Appointment scheduled for 1/10/19 @ 7pm. E-mail notification sent.

## 2019-01-10 ENCOUNTER — EH NON-PROVIDER (OUTPATIENT)
Dept: OCCUPATIONAL MEDICINE | Facility: CLINIC | Age: 34
End: 2019-01-10

## 2019-01-10 DIAGNOSIS — Z02.89 ENCOUNTER FOR OCCUPATIONAL HEALTH EXAMINATION INVOLVING RESPIRATOR: Primary | ICD-10-CM

## 2019-01-10 PROCEDURE — 94375 RESPIRATORY FLOW VOLUME LOOP: CPT | Performed by: PREVENTIVE MEDICINE

## 2019-01-25 ENCOUNTER — HOSPITAL ENCOUNTER (OUTPATIENT)
Dept: LAB | Facility: MEDICAL CENTER | Age: 34
End: 2019-01-25
Attending: FAMILY MEDICINE
Payer: COMMERCIAL

## 2019-01-25 LAB
ALBUMIN SERPL BCP-MCNC: 4 G/DL (ref 3.2–4.9)
ALBUMIN/GLOB SERPL: 1.7 G/DL
ALP SERPL-CCNC: 38 U/L (ref 30–99)
ALT SERPL-CCNC: 7 U/L (ref 2–50)
ANION GAP SERPL CALC-SCNC: 6 MMOL/L (ref 0–11.9)
AST SERPL-CCNC: 13 U/L (ref 12–45)
BASOPHILS # BLD AUTO: 1.7 % (ref 0–1.8)
BASOPHILS # BLD: 0.09 K/UL (ref 0–0.12)
BILIRUB SERPL-MCNC: 0.6 MG/DL (ref 0.1–1.5)
BUN SERPL-MCNC: 16 MG/DL (ref 8–22)
CALCIUM SERPL-MCNC: 9 MG/DL (ref 8.5–10.5)
CHLORIDE SERPL-SCNC: 105 MMOL/L (ref 96–112)
CHOLEST SERPL-MCNC: 182 MG/DL (ref 100–199)
CO2 SERPL-SCNC: 27 MMOL/L (ref 20–33)
CREAT SERPL-MCNC: 0.79 MG/DL (ref 0.5–1.4)
EOSINOPHIL # BLD AUTO: 0.17 K/UL (ref 0–0.51)
EOSINOPHIL NFR BLD: 3.2 % (ref 0–6.9)
ERYTHROCYTE [DISTWIDTH] IN BLOOD BY AUTOMATED COUNT: 45.7 FL (ref 35.9–50)
FASTING STATUS PATIENT QL REPORTED: NORMAL
GLOBULIN SER CALC-MCNC: 2.4 G/DL (ref 1.9–3.5)
GLUCOSE SERPL-MCNC: 158 MG/DL (ref 65–99)
HCT VFR BLD AUTO: 46 % (ref 37–47)
HDLC SERPL-MCNC: 64 MG/DL
HGB BLD-MCNC: 14.9 G/DL (ref 12–16)
IMM GRANULOCYTES # BLD AUTO: 0.01 K/UL (ref 0–0.11)
IMM GRANULOCYTES NFR BLD AUTO: 0.2 % (ref 0–0.9)
LDLC SERPL CALC-MCNC: 105 MG/DL
LYMPHOCYTES # BLD AUTO: 1.75 K/UL (ref 1–4.8)
LYMPHOCYTES NFR BLD: 32.6 % (ref 22–41)
MCH RBC QN AUTO: 31.2 PG (ref 27–33)
MCHC RBC AUTO-ENTMCNC: 32.4 G/DL (ref 33.6–35)
MCV RBC AUTO: 96.4 FL (ref 81.4–97.8)
MONOCYTES # BLD AUTO: 0.56 K/UL (ref 0–0.85)
MONOCYTES NFR BLD AUTO: 10.4 % (ref 0–13.4)
NEUTROPHILS # BLD AUTO: 2.79 K/UL (ref 2–7.15)
NEUTROPHILS NFR BLD: 51.9 % (ref 44–72)
NRBC # BLD AUTO: 0 K/UL
NRBC BLD-RTO: 0 /100 WBC
PLATELET # BLD AUTO: 237 K/UL (ref 164–446)
PMV BLD AUTO: 10.7 FL (ref 9–12.9)
POTASSIUM SERPL-SCNC: 4.4 MMOL/L (ref 3.6–5.5)
PROT SERPL-MCNC: 6.4 G/DL (ref 6–8.2)
RBC # BLD AUTO: 4.77 M/UL (ref 4.2–5.4)
SODIUM SERPL-SCNC: 138 MMOL/L (ref 135–145)
TRIGL SERPL-MCNC: 67 MG/DL (ref 0–149)
WBC # BLD AUTO: 5.4 K/UL (ref 4.8–10.8)

## 2019-01-25 PROCEDURE — 80053 COMPREHEN METABOLIC PANEL: CPT

## 2019-01-25 PROCEDURE — 85025 COMPLETE CBC W/AUTO DIFF WBC: CPT

## 2019-01-25 PROCEDURE — 36415 COLL VENOUS BLD VENIPUNCTURE: CPT

## 2019-01-25 PROCEDURE — 80061 LIPID PANEL: CPT

## 2019-04-02 ENCOUNTER — HOSPITAL ENCOUNTER (OUTPATIENT)
Dept: LAB | Facility: MEDICAL CENTER | Age: 34
End: 2019-04-02
Attending: ANESTHESIOLOGY
Payer: COMMERCIAL

## 2019-04-02 LAB
ANION GAP SERPL CALC-SCNC: 8 MMOL/L (ref 0–11.9)
BUN SERPL-MCNC: 19 MG/DL (ref 8–22)
CALCIUM SERPL-MCNC: 9.2 MG/DL (ref 8.5–10.5)
CHLORIDE SERPL-SCNC: 106 MMOL/L (ref 96–112)
CO2 SERPL-SCNC: 26 MMOL/L (ref 20–33)
CREAT SERPL-MCNC: 0.92 MG/DL (ref 0.5–1.4)
GLUCOSE SERPL-MCNC: 123 MG/DL (ref 65–99)
HCG SERPL QL: NEGATIVE
HCT VFR BLD AUTO: 46.8 % (ref 37–47)
POTASSIUM SERPL-SCNC: 4.3 MMOL/L (ref 3.6–5.5)
SODIUM SERPL-SCNC: 140 MMOL/L (ref 135–145)

## 2019-04-02 PROCEDURE — 84703 CHORIONIC GONADOTROPIN ASSAY: CPT

## 2019-04-02 PROCEDURE — 36415 COLL VENOUS BLD VENIPUNCTURE: CPT

## 2019-04-02 PROCEDURE — 85014 HEMATOCRIT: CPT

## 2019-04-02 PROCEDURE — 80048 BASIC METABOLIC PNL TOTAL CA: CPT

## 2019-05-16 NOTE — ADDENDUM NOTE
Encounter addended by: Yuridia Silver R.N. on: 5/16/2019 11:09 AM<BR>    Actions taken: Utilization Review saved, Flowsheet accepted

## 2019-09-25 ENCOUNTER — IMMUNIZATION (OUTPATIENT)
Dept: OCCUPATIONAL MEDICINE | Facility: CLINIC | Age: 34
End: 2019-09-25

## 2019-09-25 DIAGNOSIS — Z23 NEED FOR VACCINATION: ICD-10-CM

## 2019-09-25 PROCEDURE — 90686 IIV4 VACC NO PRSV 0.5 ML IM: CPT | Performed by: PREVENTIVE MEDICINE

## 2019-10-03 ENCOUNTER — EH NON-PROVIDER (OUTPATIENT)
Dept: OCCUPATIONAL MEDICINE | Facility: CLINIC | Age: 34
End: 2019-10-03

## 2019-10-03 DIAGNOSIS — Z02.89 ENCOUNTER FOR OCCUPATIONAL HEALTH EXAMINATION INVOLVING RESPIRATOR: Primary | ICD-10-CM

## 2019-10-03 PROCEDURE — 94375 RESPIRATORY FLOW VOLUME LOOP: CPT | Performed by: NURSE PRACTITIONER

## 2019-10-05 ENCOUNTER — DOCUMENTATION (OUTPATIENT)
Dept: OCCUPATIONAL MEDICINE | Facility: CLINIC | Age: 34
End: 2019-10-05

## 2019-10-05 NOTE — PROGRESS NOTES
Respiratory questionnaire reviewed and signed. See scanned documents.    OK for mask fit event.        
[facility]

## 2020-05-26 ENCOUNTER — HOSPITAL ENCOUNTER (OUTPATIENT)
Dept: LAB | Facility: MEDICAL CENTER | Age: 35
End: 2020-05-26
Attending: OBSTETRICS & GYNECOLOGY
Payer: COMMERCIAL

## 2020-05-26 LAB
HBV SURFACE AB SERPL IA-ACNC: 682 MIU/ML (ref 0–10)
HBV SURFACE AG SER QL: NORMAL
HCV AB SER QL: NORMAL
HIV 1+2 AB+HIV1 P24 AG SERPL QL IA: NORMAL
PROLACTIN SERPL-MCNC: 13.4 NG/ML (ref 2.8–26)
TREPONEMA PALLIDUM IGG+IGM AB [PRESENCE] IN SERUM OR PLASMA BY IMMUNOASSAY: NORMAL
TSH SERPL DL<=0.005 MIU/L-ACNC: 1.34 UIU/ML (ref 0.38–5.33)

## 2020-05-26 PROCEDURE — 87591 N.GONORRHOEAE DNA AMP PROB: CPT

## 2020-05-26 PROCEDURE — 86706 HEP B SURFACE ANTIBODY: CPT

## 2020-05-26 PROCEDURE — 86780 TREPONEMA PALLIDUM: CPT

## 2020-05-26 PROCEDURE — 83520 IMMUNOASSAY QUANT NOS NONAB: CPT

## 2020-05-26 PROCEDURE — 86803 HEPATITIS C AB TEST: CPT

## 2020-05-26 PROCEDURE — 36415 COLL VENOUS BLD VENIPUNCTURE: CPT

## 2020-05-26 PROCEDURE — 87491 CHLMYD TRACH DNA AMP PROBE: CPT

## 2020-05-26 PROCEDURE — 87340 HEPATITIS B SURFACE AG IA: CPT

## 2020-05-26 PROCEDURE — 84443 ASSAY THYROID STIM HORMONE: CPT

## 2020-05-26 PROCEDURE — 83036 HEMOGLOBIN GLYCOSYLATED A1C: CPT

## 2020-05-26 PROCEDURE — 84146 ASSAY OF PROLACTIN: CPT

## 2020-05-26 PROCEDURE — 87389 HIV-1 AG W/HIV-1&-2 AB AG IA: CPT

## 2020-05-27 LAB
C TRACH DNA SPEC QL NAA+PROBE: NEGATIVE
EST. AVERAGE GLUCOSE BLD GHB EST-MCNC: 154 MG/DL
HBA1C MFR BLD: 7 % (ref 0–5.6)
N GONORRHOEA DNA SPEC QL NAA+PROBE: NEGATIVE
SPECIMEN SOURCE: NORMAL

## 2020-05-29 LAB — MIS SERPL-MCNC: 7.13 NG/ML (ref 0.18–11.71)

## 2020-10-08 ENCOUNTER — IMMUNIZATION (OUTPATIENT)
Dept: OCCUPATIONAL MEDICINE | Facility: CLINIC | Age: 35
End: 2020-10-08

## 2020-10-08 DIAGNOSIS — Z23 NEED FOR VACCINATION: ICD-10-CM

## 2020-10-08 PROCEDURE — 90686 IIV4 VACC NO PRSV 0.5 ML IM: CPT | Performed by: NURSE PRACTITIONER

## 2020-12-04 ENCOUNTER — EH NON-PROVIDER (OUTPATIENT)
Dept: OCCUPATIONAL MEDICINE | Facility: CLINIC | Age: 35
End: 2020-12-04

## 2020-12-04 ENCOUNTER — HOSPITAL ENCOUNTER (OUTPATIENT)
Facility: MEDICAL CENTER | Age: 35
End: 2020-12-04
Attending: NURSE PRACTITIONER
Payer: COMMERCIAL

## 2020-12-04 DIAGNOSIS — Z11.59 ENCOUNTER FOR SCREENING FOR OTHER VIRAL DISEASES: ICD-10-CM

## 2020-12-04 PROCEDURE — U0003 INFECTIOUS AGENT DETECTION BY NUCLEIC ACID (DNA OR RNA); SEVERE ACUTE RESPIRATORY SYNDROME CORONAVIRUS 2 (SARS-COV-2) (CORONAVIRUS DISEASE [COVID-19]), AMPLIFIED PROBE TECHNIQUE, MAKING USE OF HIGH THROUGHPUT TECHNOLOGIES AS DESCRIBED BY CMS-2020-01-R: HCPCS | Mod: CS | Performed by: NURSE PRACTITIONER

## 2020-12-05 DIAGNOSIS — Z11.59 ENCOUNTER FOR SCREENING FOR OTHER VIRAL DISEASES: ICD-10-CM

## 2020-12-06 LAB
COVID ORDER STATUS COVID19: NORMAL
SARS-COV-2 RNA RESP QL NAA+PROBE: NOTDETECTED
SPECIMEN SOURCE: NORMAL

## 2020-12-16 DIAGNOSIS — Z23 NEED FOR VACCINATION: ICD-10-CM

## 2020-12-20 ENCOUNTER — APPOINTMENT (OUTPATIENT)
Dept: FAMILY PLANNING/WOMEN'S HEALTH CLINIC | Facility: IMMUNIZATION CENTER | Age: 35
End: 2020-12-20
Payer: COMMERCIAL

## 2020-12-20 PROCEDURE — 0001A PFIZER SARS-COV-2 VACCINE: CPT

## 2020-12-20 PROCEDURE — 91300 PFIZER SARS-COV-2 VACCINE: CPT

## 2020-12-21 ENCOUNTER — IMMUNIZATION (OUTPATIENT)
Dept: FAMILY PLANNING/WOMEN'S HEALTH CLINIC | Facility: IMMUNIZATION CENTER | Age: 35
End: 2020-12-21
Payer: COMMERCIAL

## 2020-12-21 DIAGNOSIS — Z23 ENCOUNTER FOR VACCINATION: Primary | ICD-10-CM

## 2021-01-12 ENCOUNTER — IMMUNIZATION (OUTPATIENT)
Dept: FAMILY PLANNING/WOMEN'S HEALTH CLINIC | Facility: IMMUNIZATION CENTER | Age: 36
End: 2021-01-12
Attending: FAMILY MEDICINE
Payer: COMMERCIAL

## 2021-01-12 DIAGNOSIS — Z23 ENCOUNTER FOR VACCINATION: Primary | ICD-10-CM

## 2021-01-12 PROCEDURE — 0002A PFIZER SARS-COV-2 VACCINE: CPT

## 2021-01-12 PROCEDURE — 91300 PFIZER SARS-COV-2 VACCINE: CPT

## 2021-03-11 ENCOUNTER — EH NON-PROVIDER (OUTPATIENT)
Dept: OCCUPATIONAL MEDICINE | Facility: CLINIC | Age: 36
End: 2021-03-11

## 2021-03-11 DIAGNOSIS — Z01.89 RESPIRATORY CLEARANCE EXAMINATION, ENCOUNTER FOR: ICD-10-CM

## 2021-03-11 PROCEDURE — 94375 RESPIRATORY FLOW VOLUME LOOP: CPT | Performed by: PREVENTIVE MEDICINE

## 2021-03-20 ENCOUNTER — HOSPITAL ENCOUNTER (OUTPATIENT)
Dept: LAB | Facility: MEDICAL CENTER | Age: 36
End: 2021-03-20
Attending: FAMILY MEDICINE
Payer: COMMERCIAL

## 2021-03-20 LAB
ALBUMIN SERPL BCP-MCNC: 4.3 G/DL (ref 3.2–4.9)
ALBUMIN/GLOB SERPL: 2 G/DL
ALP SERPL-CCNC: 44 U/L (ref 30–99)
ALT SERPL-CCNC: 6 U/L (ref 2–50)
ANION GAP SERPL CALC-SCNC: 8 MMOL/L (ref 7–16)
AST SERPL-CCNC: 7 U/L (ref 12–45)
BILIRUB SERPL-MCNC: 0.5 MG/DL (ref 0.1–1.5)
BUN SERPL-MCNC: 12 MG/DL (ref 8–22)
CALCIUM SERPL-MCNC: 9.4 MG/DL (ref 8.5–10.5)
CHLORIDE SERPL-SCNC: 105 MMOL/L (ref 96–112)
CHOLEST SERPL-MCNC: 173 MG/DL (ref 100–199)
CO2 SERPL-SCNC: 26 MMOL/L (ref 20–33)
CREAT SERPL-MCNC: 0.75 MG/DL (ref 0.5–1.4)
CREAT UR-MCNC: 128.35 MG/DL
FASTING STATUS PATIENT QL REPORTED: NORMAL
GLOBULIN SER CALC-MCNC: 2.2 G/DL (ref 1.9–3.5)
GLUCOSE SERPL-MCNC: 103 MG/DL (ref 65–99)
HDLC SERPL-MCNC: 71 MG/DL
LDLC SERPL CALC-MCNC: 94 MG/DL
MICROALBUMIN UR-MCNC: <1.2 MG/DL
MICROALBUMIN/CREAT UR: NORMAL MG/G (ref 0–30)
POTASSIUM SERPL-SCNC: 4.4 MMOL/L (ref 3.6–5.5)
PROT SERPL-MCNC: 6.5 G/DL (ref 6–8.2)
SODIUM SERPL-SCNC: 139 MMOL/L (ref 135–145)
TRIGL SERPL-MCNC: 42 MG/DL (ref 0–149)

## 2021-03-20 PROCEDURE — 36415 COLL VENOUS BLD VENIPUNCTURE: CPT

## 2021-03-20 PROCEDURE — 82570 ASSAY OF URINE CREATININE: CPT

## 2021-03-20 PROCEDURE — 80061 LIPID PANEL: CPT

## 2021-03-20 PROCEDURE — 82043 UR ALBUMIN QUANTITATIVE: CPT

## 2021-03-20 PROCEDURE — 80053 COMPREHEN METABOLIC PANEL: CPT

## 2021-04-09 NOTE — ADDENDUM NOTE
Addended by: CHRISTY CLARK on: 12/4/2020 09:53 AM     Modules accepted: Orders    
<<----- Click to add NO significant Past Surgical History

## 2021-09-22 ENCOUNTER — IMMUNIZATION (OUTPATIENT)
Dept: OCCUPATIONAL MEDICINE | Facility: CLINIC | Age: 36
End: 2021-09-22

## 2021-09-22 DIAGNOSIS — Z23 NEED FOR VACCINATION: Primary | ICD-10-CM

## 2021-09-29 PROCEDURE — 90686 IIV4 VACC NO PRSV 0.5 ML IM: CPT | Performed by: PREVENTIVE MEDICINE

## 2021-11-04 ENCOUNTER — APPOINTMENT (RX ONLY)
Dept: URBAN - METROPOLITAN AREA CLINIC 22 | Facility: CLINIC | Age: 36
Setting detail: DERMATOLOGY
End: 2021-11-04

## 2021-11-04 DIAGNOSIS — L70.8 OTHER ACNE: ICD-10-CM

## 2021-11-04 PROCEDURE — ? COUNSELING

## 2021-11-04 PROCEDURE — ? ADDITIONAL NOTES

## 2021-11-04 PROCEDURE — ? PRESCRIPTION

## 2021-11-04 PROCEDURE — 99203 OFFICE O/P NEW LOW 30 MIN: CPT

## 2021-11-04 ASSESSMENT — LOCATION ZONE DERM: LOCATION ZONE: FACE

## 2021-11-04 ASSESSMENT — LOCATION SIMPLE DESCRIPTION DERM: LOCATION SIMPLE: CHIN

## 2021-11-04 ASSESSMENT — LOCATION DETAILED DESCRIPTION DERM: LOCATION DETAILED: LEFT CHIN

## 2021-11-04 NOTE — PROCEDURE: COUNSELING
Doxycycline Pregnancy And Lactation Text: This medication is Pregnancy Category D and not consider safe during pregnancy. It is also excreted in breast milk but is considered safe for shorter treatment courses.
Tetracycline Counseling: Patient counseled regarding possible photosensitivity and increased risk for sunburn.  Patient instructed to avoid sunlight, if possible.  When exposed to sunlight, patients should wear protective clothing, sunglasses, and sunscreen.  The patient was instructed to call the office immediately if the following severe adverse effects occur:  hearing changes, easy bruising/bleeding, severe headache, or vision changes.  The patient verbalized understanding of the proper use and possible adverse effects of tetracycline.  All of the patient's questions and concerns were addressed. Patient understands to avoid pregnancy while on therapy due to potential birth defects.
Sarecycline Counseling: Patient advised regarding possible photosensitivity and discoloration of the teeth, skin, lips, tongue and gums.  Patient instructed to avoid sunlight, if possible.  When exposed to sunlight, patients should wear protective clothing, sunglasses, and sunscreen.  The patient was instructed to call the office immediately if the following severe adverse effects occur:  hearing changes, easy bruising/bleeding, severe headache, or vision changes.  The patient verbalized understanding of the proper use and possible adverse effects of sarecycline.  All of the patient's questions and concerns were addressed.
Topical Clindamycin Pregnancy And Lactation Text: This medication is Pregnancy Category B and is considered safe during pregnancy. It is unknown if it is excreted in breast milk.
Topical Retinoid Pregnancy And Lactation Text: This medication is Pregnancy Category C. It is unknown if this medication is excreted in breast milk.
Use Enhanced Medication Counseling?: No
Birth Control Pills Pregnancy And Lactation Text: This medication should be avoided if pregnant and for the first 30 days post-partum.
Azithromycin Pregnancy And Lactation Text: This medication is considered safe during pregnancy and is also secreted in breast milk.
Topical Sulfur Applications Counseling: Topical Sulfur Counseling: Patient counseled that this medication may cause skin irritation or allergic reactions.  In the event of skin irritation, the patient was advised to reduce the amount of the drug applied or use it less frequently.   The patient verbalized understanding of the proper use and possible adverse effects of topical sulfur application.  All of the patient's questions and concerns were addressed.
High Dose Vitamin A Counseling: Side effects reviewed, pt to contact office should one occur.
Erythromycin Counseling:  I discussed with the patient the risks of erythromycin including but not limited to GI upset, allergic reaction, drug rash, diarrhea, increase in liver enzymes, and yeast infections.
Tetracycline Pregnancy And Lactation Text: This medication is Pregnancy Category D and not consider safe during pregnancy. It is also excreted in breast milk.
Dapsone Counseling: I discussed with the patient the risks of dapsone including but not limited to hemolytic anemia, agranulocytosis, rashes, methemoglobinemia, kidney failure, peripheral neuropathy, headaches, GI upset, and liver toxicity.  Patients who start dapsone require monitoring including baseline LFTs and weekly CBCs for the first month, then every month thereafter.  The patient verbalized understanding of the proper use and possible adverse effects of dapsone.  All of the patient's questions and concerns were addressed.
Detail Level: Zone
High Dose Vitamin A Pregnancy And Lactation Text: High dose vitamin A therapy is contraindicated during pregnancy and breast feeding.
Tazorac Counseling:  Patient advised that medication is irritating and drying.  Patient may need to apply sparingly and wash off after an hour before eventually leaving it on overnight.  The patient verbalized understanding of the proper use and possible adverse effects of tazorac.  All of the patient's questions and concerns were addressed.
Benzoyl Peroxide Counseling: Patient counseled that medicine may cause skin irritation and bleach clothing.  In the event of skin irritation, the patient was advised to reduce the amount of the drug applied or use it less frequently.   The patient verbalized understanding of the proper use and possible adverse effects of benzoyl peroxide.  All of the patient's questions and concerns were addressed.
Bactrim Counseling:  I discussed with the patient the risks of sulfa antibiotics including but not limited to GI upset, allergic reaction, drug rash, diarrhea, dizziness, photosensitivity, and yeast infections.  Rarely, more serious reactions can occur including but not limited to aplastic anemia, agranulocytosis, methemoglobinemia, blood dyscrasias, liver or kidney failure, lung infiltrates or desquamative/blistering drug rashes.
Topical Sulfur Applications Pregnancy And Lactation Text: This medication is Pregnancy Category C and has an unknown safety profile during pregnancy. It is unknown if this topical medication is excreted in breast milk.
Spironolactone Counseling: Patient advised regarding risks of diarrhea, abdominal pain, hyperkalemia, birth defects (for female patients), liver toxicity and renal toxicity. The patient may need blood work to monitor liver and kidney function and potassium levels while on therapy. The patient verbalized understanding of the proper use and possible adverse effects of spironolactone.  All of the patient's questions and concerns were addressed.
Erythromycin Pregnancy And Lactation Text: This medication is Pregnancy Category B and is considered safe during pregnancy. It is also excreted in breast milk.
Tazorac Pregnancy And Lactation Text: This medication is not safe during pregnancy. It is unknown if this medication is excreted in breast milk.
Dapsone Pregnancy And Lactation Text: This medication is Pregnancy Category C and is not considered safe during pregnancy or breast feeding.
Bactrim Pregnancy And Lactation Text: This medication is Pregnancy Category D and is known to cause fetal risk.  It is also excreted in breast milk.
Minocycline Counseling: Patient advised regarding possible photosensitivity and discoloration of the teeth, skin, lips, tongue and gums.  Patient instructed to avoid sunlight, if possible.  When exposed to sunlight, patients should wear protective clothing, sunglasses, and sunscreen.  The patient was instructed to call the office immediately if the following severe adverse effects occur:  hearing changes, easy bruising/bleeding, severe headache, or vision changes.  The patient verbalized understanding of the proper use and possible adverse effects of minocycline.  All of the patient's questions and concerns were addressed.
Isotretinoin Counseling: Patient should get monthly blood tests, not donate blood, not drive at night if vision affected, not share medication, and not undergo elective surgery for 6 months after tx completed. Side effects reviewed, pt to contact office should one occur.
Benzoyl Peroxide Pregnancy And Lactation Text: This medication is Pregnancy Category C. It is unknown if benzoyl peroxide is excreted in breast milk.
Doxycycline Counseling:  Patient counseled regarding possible photosensitivity and increased risk for sunburn.  Patient instructed to avoid sunlight, if possible.  When exposed to sunlight, patients should wear protective clothing, sunglasses, and sunscreen.  The patient was instructed to call the office immediately if the following severe adverse effects occur:  hearing changes, easy bruising/bleeding, severe headache, or vision changes.  The patient verbalized understanding of the proper use and possible adverse effects of doxycycline.  All of the patient's questions and concerns were addressed.
Spironolactone Pregnancy And Lactation Text: This medication can cause feminization of the male fetus and should be avoided during pregnancy. The active metabolite is also found in breast milk.
Birth Control Pills Counseling: Birth Control Pill Counseling: I discussed with the patient the potential side effects of OCPs including but not limited to increased risk of stroke, heart attack, thrombophlebitis, deep venous thrombosis, hepatic adenomas, breast changes, GI upset, headaches, and depression.  The patient verbalized understanding of the proper use and possible adverse effects of OCPs. All of the patient's questions and concerns were addressed.
Topical Clindamycin Counseling: Patient counseled that this medication may cause skin irritation or allergic reactions.  In the event of skin irritation, the patient was advised to reduce the amount of the drug applied or use it less frequently.   The patient verbalized understanding of the proper use and possible adverse effects of clindamycin.  All of the patient's questions and concerns were addressed.
Azithromycin Counseling:  I discussed with the patient the risks of azithromycin including but not limited to GI upset, allergic reaction, drug rash, diarrhea, and yeast infections.
Isotretinoin Pregnancy And Lactation Text: This medication is Pregnancy Category X and is considered extremely dangerous during pregnancy. It is unknown if it is excreted in breast milk.
Topical Retinoid counseling:  Patient advised to apply a pea-sized amount only at bedtime and wait 30 minutes after washing their face before applying.  If too drying, patient may add a non-comedogenic moisturizer. The patient verbalized understanding of the proper use and possible adverse effects of retinoids.  All of the patient's questions and concerns were addressed.

## 2021-11-04 NOTE — PROCEDURE: ADDITIONAL NOTES
Additional Notes: Patient feels that previous topical regimens have not worked too well\\nShe prefers not to go on oral abx. DIscussed oral Spironolactone however patient is already on Losartan for DM 1.   She does have a cyclical nature to her acne.  Will trial topical Spironolactone/Dapsone combination and follow up in 3 months.  Has been better lately because she has not had to wear a mask all day at work
Render Risk Assessment In Note?: no
Detail Level: Detailed

## 2021-11-08 RX ORDER — DAPSONE/SPIRONOLACTONE/NIACIN 8.5-5-2 %
1 GEL (GRAM) TOPICAL QD
Qty: 30 | Refills: 3 | Status: ERX | COMMUNITY
Start: 2021-11-08

## 2021-11-08 RX ADMIN — Medication 1: at 00:00

## 2022-01-06 ENCOUNTER — APPOINTMENT (RX ONLY)
Dept: URBAN - METROPOLITAN AREA CLINIC 22 | Facility: CLINIC | Age: 37
Setting detail: DERMATOLOGY
End: 2022-01-06

## 2022-01-06 DIAGNOSIS — L81.4 OTHER MELANIN HYPERPIGMENTATION: ICD-10-CM

## 2022-01-06 DIAGNOSIS — D18.0 HEMANGIOMA: ICD-10-CM

## 2022-01-06 DIAGNOSIS — L71.0 PERIORAL DERMATITIS: ICD-10-CM

## 2022-01-06 DIAGNOSIS — D22 MELANOCYTIC NEVI: ICD-10-CM

## 2022-01-06 DIAGNOSIS — Z71.89 OTHER SPECIFIED COUNSELING: ICD-10-CM

## 2022-01-06 DIAGNOSIS — L70.8 OTHER ACNE: ICD-10-CM | Status: STABLE

## 2022-01-06 PROBLEM — D22.5 MELANOCYTIC NEVI OF TRUNK: Status: ACTIVE | Noted: 2022-01-06

## 2022-01-06 PROBLEM — D18.01 HEMANGIOMA OF SKIN AND SUBCUTANEOUS TISSUE: Status: ACTIVE | Noted: 2022-01-06

## 2022-01-06 PROBLEM — L30.9 DERMATITIS, UNSPECIFIED: Status: ACTIVE | Noted: 2022-01-06

## 2022-01-06 PROCEDURE — ? COUNSELING

## 2022-01-06 PROCEDURE — ? SUNSCREEN RECOMMENDATIONS

## 2022-01-06 PROCEDURE — ? PRESCRIPTION

## 2022-01-06 PROCEDURE — 99214 OFFICE O/P EST MOD 30 MIN: CPT

## 2022-01-06 PROCEDURE — ? ADDITIONAL NOTES

## 2022-01-06 RX ORDER — METRONIDAZOLE 7.5 MG/G
1 CREAM TOPICAL BID
Qty: 45 | Refills: 2 | Status: ERX | COMMUNITY
Start: 2022-01-06

## 2022-01-06 RX ORDER — CLASCOTERONE 1 G/100G
1 CREAM TOPICAL BID
Qty: 60 | Refills: 5 | Status: ERX | COMMUNITY
Start: 2022-01-06

## 2022-01-06 RX ADMIN — METRONIDAZOLE 1: 7.5 CREAM TOPICAL at 00:00

## 2022-01-06 RX ADMIN — CLASCOTERONE 1: 1 CREAM TOPICAL at 00:00

## 2022-01-06 ASSESSMENT — LOCATION ZONE DERM
LOCATION ZONE: ARM
LOCATION ZONE: LIP
LOCATION ZONE: FACE
LOCATION ZONE: TRUNK

## 2022-01-06 ASSESSMENT — LOCATION DETAILED DESCRIPTION DERM
LOCATION DETAILED: LEFT INFERIOR UPPER BACK
LOCATION DETAILED: LEFT PROXIMAL DORSAL FOREARM
LOCATION DETAILED: RIGHT UPPER CUTANEOUS LIP
LOCATION DETAILED: LEFT CHIN
LOCATION DETAILED: LEFT LATERAL ABDOMEN

## 2022-01-06 ASSESSMENT — LOCATION SIMPLE DESCRIPTION DERM
LOCATION SIMPLE: RIGHT LIP
LOCATION SIMPLE: CHIN
LOCATION SIMPLE: ABDOMEN
LOCATION SIMPLE: LEFT FOREARM
LOCATION SIMPLE: LEFT UPPER BACK

## 2022-01-06 ASSESSMENT — INVESTIGATOR STATIC GLOBAL ASSESSMENT: IN YOUR EXPERIENCE, AMONG ALL PATIENTS YOU HAVE SEEN WITH THIS CONDITION, HOW SEVERE IS THIS PATIENT'S CONDITION?: MILD

## 2022-01-06 ASSESSMENT — SEVERITY ASSESSMENT OVERALL AMONG ALL PATIENTS
IN YOUR EXPERIENCE, AMONG ALL PATIENTS YOU HAVE SEEN WITH THIS CONDITION, HOW SEVERE IS THIS PATIENT'S CONDITION?: FEW INFLAMMATORY LESIONS, SOME NONINFLAMMATORY

## 2022-01-06 NOTE — PROCEDURE: ADDITIONAL NOTES
Additional Notes: Patient tried diasdimaxia.  She discontinued due to the smell.
Render Risk Assessment In Note?: no
Detail Level: Detailed
Additional Notes: Patient has adult female acne however suspect may also have overlapping perioral derm vs rosacea, possibly irritant derm that worsens due to wearing mask.   Trial of metronidazole cream, if not improving patient will contact.
Detail Level: Simple

## 2022-01-06 NOTE — PROCEDURE: COUNSELING
Doxycycline Pregnancy And Lactation Text: This medication is Pregnancy Category D and not consider safe during pregnancy. It is also excreted in breast milk but is considered safe for shorter treatment courses.
Tetracycline Counseling: Patient counseled regarding possible photosensitivity and increased risk for sunburn.  Patient instructed to avoid sunlight, if possible.  When exposed to sunlight, patients should wear protective clothing, sunglasses, and sunscreen.  The patient was instructed to call the office immediately if the following severe adverse effects occur:  hearing changes, easy bruising/bleeding, severe headache, or vision changes.  The patient verbalized understanding of the proper use and possible adverse effects of tetracycline.  All of the patient's questions and concerns were addressed. Patient understands to avoid pregnancy while on therapy due to potential birth defects.
Sarecycline Counseling: Patient advised regarding possible photosensitivity and discoloration of the teeth, skin, lips, tongue and gums.  Patient instructed to avoid sunlight, if possible.  When exposed to sunlight, patients should wear protective clothing, sunglasses, and sunscreen.  The patient was instructed to call the office immediately if the following severe adverse effects occur:  hearing changes, easy bruising/bleeding, severe headache, or vision changes.  The patient verbalized understanding of the proper use and possible adverse effects of sarecycline.  All of the patient's questions and concerns were addressed.
Topical Clindamycin Pregnancy And Lactation Text: This medication is Pregnancy Category B and is considered safe during pregnancy. It is unknown if it is excreted in breast milk.
Topical Retinoid Pregnancy And Lactation Text: This medication is Pregnancy Category C. It is unknown if this medication is excreted in breast milk.
Use Enhanced Medication Counseling?: No
Birth Control Pills Pregnancy And Lactation Text: This medication should be avoided if pregnant and for the first 30 days post-partum.
Azithromycin Pregnancy And Lactation Text: This medication is considered safe during pregnancy and is also secreted in breast milk.
Topical Sulfur Applications Counseling: Topical Sulfur Counseling: Patient counseled that this medication may cause skin irritation or allergic reactions.  In the event of skin irritation, the patient was advised to reduce the amount of the drug applied or use it less frequently.   The patient verbalized understanding of the proper use and possible adverse effects of topical sulfur application.  All of the patient's questions and concerns were addressed.
High Dose Vitamin A Counseling: Side effects reviewed, pt to contact office should one occur.
Erythromycin Counseling:  I discussed with the patient the risks of erythromycin including but not limited to GI upset, allergic reaction, drug rash, diarrhea, increase in liver enzymes, and yeast infections.
Tetracycline Pregnancy And Lactation Text: This medication is Pregnancy Category D and not consider safe during pregnancy. It is also excreted in breast milk.
Dapsone Counseling: I discussed with the patient the risks of dapsone including but not limited to hemolytic anemia, agranulocytosis, rashes, methemoglobinemia, kidney failure, peripheral neuropathy, headaches, GI upset, and liver toxicity.  Patients who start dapsone require monitoring including baseline LFTs and weekly CBCs for the first month, then every month thereafter.  The patient verbalized understanding of the proper use and possible adverse effects of dapsone.  All of the patient's questions and concerns were addressed.
Detail Level: Zone
High Dose Vitamin A Pregnancy And Lactation Text: High dose vitamin A therapy is contraindicated during pregnancy and breast feeding.
Tazorac Counseling:  Patient advised that medication is irritating and drying.  Patient may need to apply sparingly and wash off after an hour before eventually leaving it on overnight.  The patient verbalized understanding of the proper use and possible adverse effects of tazorac.  All of the patient's questions and concerns were addressed.
Benzoyl Peroxide Counseling: Patient counseled that medicine may cause skin irritation and bleach clothing.  In the event of skin irritation, the patient was advised to reduce the amount of the drug applied or use it less frequently.   The patient verbalized understanding of the proper use and possible adverse effects of benzoyl peroxide.  All of the patient's questions and concerns were addressed.
Bactrim Counseling:  I discussed with the patient the risks of sulfa antibiotics including but not limited to GI upset, allergic reaction, drug rash, diarrhea, dizziness, photosensitivity, and yeast infections.  Rarely, more serious reactions can occur including but not limited to aplastic anemia, agranulocytosis, methemoglobinemia, blood dyscrasias, liver or kidney failure, lung infiltrates or desquamative/blistering drug rashes.
Topical Sulfur Applications Pregnancy And Lactation Text: This medication is Pregnancy Category C and has an unknown safety profile during pregnancy. It is unknown if this topical medication is excreted in breast milk.
Spironolactone Counseling: Patient advised regarding risks of diarrhea, abdominal pain, hyperkalemia, birth defects (for female patients), liver toxicity and renal toxicity. The patient may need blood work to monitor liver and kidney function and potassium levels while on therapy. The patient verbalized understanding of the proper use and possible adverse effects of spironolactone.  All of the patient's questions and concerns were addressed.
Erythromycin Pregnancy And Lactation Text: This medication is Pregnancy Category B and is considered safe during pregnancy. It is also excreted in breast milk.
Tazorac Pregnancy And Lactation Text: This medication is not safe during pregnancy. It is unknown if this medication is excreted in breast milk.
Dapsone Pregnancy And Lactation Text: This medication is Pregnancy Category C and is not considered safe during pregnancy or breast feeding.
Bactrim Pregnancy And Lactation Text: This medication is Pregnancy Category D and is known to cause fetal risk.  It is also excreted in breast milk.
Minocycline Counseling: Patient advised regarding possible photosensitivity and discoloration of the teeth, skin, lips, tongue and gums.  Patient instructed to avoid sunlight, if possible.  When exposed to sunlight, patients should wear protective clothing, sunglasses, and sunscreen.  The patient was instructed to call the office immediately if the following severe adverse effects occur:  hearing changes, easy bruising/bleeding, severe headache, or vision changes.  The patient verbalized understanding of the proper use and possible adverse effects of minocycline.  All of the patient's questions and concerns were addressed.
Isotretinoin Counseling: Patient should get monthly blood tests, not donate blood, not drive at night if vision affected, not share medication, and not undergo elective surgery for 6 months after tx completed. Side effects reviewed, pt to contact office should one occur.
Benzoyl Peroxide Pregnancy And Lactation Text: This medication is Pregnancy Category C. It is unknown if benzoyl peroxide is excreted in breast milk.
Doxycycline Counseling:  Patient counseled regarding possible photosensitivity and increased risk for sunburn.  Patient instructed to avoid sunlight, if possible.  When exposed to sunlight, patients should wear protective clothing, sunglasses, and sunscreen.  The patient was instructed to call the office immediately if the following severe adverse effects occur:  hearing changes, easy bruising/bleeding, severe headache, or vision changes.  The patient verbalized understanding of the proper use and possible adverse effects of doxycycline.  All of the patient's questions and concerns were addressed.
Spironolactone Pregnancy And Lactation Text: This medication can cause feminization of the male fetus and should be avoided during pregnancy. The active metabolite is also found in breast milk.
Birth Control Pills Counseling: Birth Control Pill Counseling: I discussed with the patient the potential side effects of OCPs including but not limited to increased risk of stroke, heart attack, thrombophlebitis, deep venous thrombosis, hepatic adenomas, breast changes, GI upset, headaches, and depression.  The patient verbalized understanding of the proper use and possible adverse effects of OCPs. All of the patient's questions and concerns were addressed.
Topical Clindamycin Counseling: Patient counseled that this medication may cause skin irritation or allergic reactions.  In the event of skin irritation, the patient was advised to reduce the amount of the drug applied or use it less frequently.   The patient verbalized understanding of the proper use and possible adverse effects of clindamycin.  All of the patient's questions and concerns were addressed.
Azithromycin Counseling:  I discussed with the patient the risks of azithromycin including but not limited to GI upset, allergic reaction, drug rash, diarrhea, and yeast infections.
Isotretinoin Pregnancy And Lactation Text: This medication is Pregnancy Category X and is considered extremely dangerous during pregnancy. It is unknown if it is excreted in breast milk.
Topical Retinoid counseling:  Patient advised to apply a pea-sized amount only at bedtime and wait 30 minutes after washing their face before applying.  If too drying, patient may add a non-comedogenic moisturizer. The patient verbalized understanding of the proper use and possible adverse effects of retinoids.  All of the patient's questions and concerns were addressed.
Detail Level: Generalized
Detail Level: Detailed

## 2022-02-02 ENCOUNTER — PHARMACY VISIT (OUTPATIENT)
Dept: PHARMACY | Facility: MEDICAL CENTER | Age: 37
End: 2022-02-02
Payer: COMMERCIAL

## 2022-02-02 PROCEDURE — RXMED WILLOW AMBULATORY MEDICATION CHARGE: Performed by: NURSE PRACTITIONER

## 2022-02-02 RX ORDER — AMOXICILLIN AND CLAVULANATE POTASSIUM 875; 125 MG/1; MG/1
TABLET, FILM COATED ORAL
Qty: 14 TABLET | Refills: 0 | OUTPATIENT
Start: 2022-02-02

## 2022-03-10 ENCOUNTER — APPOINTMENT (RX ONLY)
Dept: URBAN - METROPOLITAN AREA CLINIC 22 | Facility: CLINIC | Age: 37
Setting detail: DERMATOLOGY
End: 2022-03-10

## 2022-03-10 DIAGNOSIS — L71.0 PERIORAL DERMATITIS: ICD-10-CM

## 2022-03-10 DIAGNOSIS — L70.8 OTHER ACNE: ICD-10-CM | Status: IMPROVED

## 2022-03-10 PROCEDURE — ? TREATMENT REGIMEN

## 2022-03-10 PROCEDURE — 99214 OFFICE O/P EST MOD 30 MIN: CPT

## 2022-03-10 PROCEDURE — ? PRESCRIPTION

## 2022-03-10 PROCEDURE — ? COUNSELING

## 2022-03-10 PROCEDURE — ? ADDITIONAL NOTES

## 2022-03-10 RX ORDER — PIMECROLIMUS 10 MG/G
THIN LAYER CREAM TOPICAL QD
Qty: 60 | Refills: 3 | Status: ERX | COMMUNITY
Start: 2022-03-10

## 2022-03-10 RX ORDER — CLASCOTERONE 1 G/100G
PEA SIZE AMOUNT CREAM TOPICAL BID
Qty: 60 | Refills: 5 | Status: CANCELLED
Stop reason: CLARIF

## 2022-03-10 RX ADMIN — PIMECROLIMUS THIN LAYER: 10 CREAM TOPICAL at 00:00

## 2022-03-10 ASSESSMENT — LOCATION SIMPLE DESCRIPTION DERM
LOCATION SIMPLE: CHIN
LOCATION SIMPLE: LEFT CHEEK
LOCATION SIMPLE: RIGHT CHEEK

## 2022-03-10 ASSESSMENT — LOCATION DETAILED DESCRIPTION DERM
LOCATION DETAILED: RIGHT CHIN
LOCATION DETAILED: RIGHT INFERIOR MEDIAL MALAR CHEEK
LOCATION DETAILED: LEFT INFERIOR MEDIAL MALAR CHEEK
LOCATION DETAILED: LEFT CHIN

## 2022-03-10 ASSESSMENT — LOCATION ZONE DERM: LOCATION ZONE: FACE

## 2022-03-10 NOTE — PROCEDURE: TREATMENT REGIMEN
Hide Aquaphor Products: No
Continue Regimen: Winlevi twice a day
Action 3: Continue
Detail Level: Zone

## 2022-03-10 NOTE — PROCEDURE: ADDITIONAL NOTES
Additional Notes: Great results with Dominicsandra.  Will continue this daily, feels her acne is significantly improved.
Render Risk Assessment In Note?: no
Detail Level: Simple
Additional Notes: No significant response to metronidazole cream.   Consider irritant contact vs perioral, worsened with her wearing mask at work.

## 2022-07-16 ENCOUNTER — HOSPITAL ENCOUNTER (OUTPATIENT)
Dept: LAB | Facility: MEDICAL CENTER | Age: 37
End: 2022-07-16
Attending: FAMILY MEDICINE
Payer: COMMERCIAL

## 2022-07-16 LAB
ALBUMIN SERPL BCP-MCNC: 4 G/DL (ref 3.2–4.9)
ALBUMIN/GLOB SERPL: 1.8 G/DL
ALP SERPL-CCNC: 60 U/L (ref 30–99)
ALT SERPL-CCNC: 11 U/L (ref 2–50)
ANION GAP SERPL CALC-SCNC: 8 MMOL/L (ref 7–16)
AST SERPL-CCNC: 13 U/L (ref 12–45)
BILIRUB SERPL-MCNC: 0.3 MG/DL (ref 0.1–1.5)
BUN SERPL-MCNC: 16 MG/DL (ref 8–22)
CALCIUM SERPL-MCNC: 8.7 MG/DL (ref 8.5–10.5)
CHLORIDE SERPL-SCNC: 104 MMOL/L (ref 96–112)
CHOLEST SERPL-MCNC: 154 MG/DL (ref 100–199)
CO2 SERPL-SCNC: 23 MMOL/L (ref 20–33)
CREAT SERPL-MCNC: 0.75 MG/DL (ref 0.5–1.4)
CREAT UR-MCNC: 173.38 MG/DL
FASTING STATUS PATIENT QL REPORTED: NORMAL
GFR SERPLBLD CREATININE-BSD FMLA CKD-EPI: 106 ML/MIN/1.73 M 2
GLOBULIN SER CALC-MCNC: 2.2 G/DL (ref 1.9–3.5)
GLUCOSE SERPL-MCNC: 172 MG/DL (ref 65–99)
HDLC SERPL-MCNC: 55 MG/DL
LDLC SERPL CALC-MCNC: 84 MG/DL
MICROALBUMIN UR-MCNC: <1.2 MG/DL
MICROALBUMIN/CREAT UR: NORMAL MG/G (ref 0–30)
POTASSIUM SERPL-SCNC: 4 MMOL/L (ref 3.6–5.5)
PROT SERPL-MCNC: 6.2 G/DL (ref 6–8.2)
SODIUM SERPL-SCNC: 135 MMOL/L (ref 135–145)
TRIGL SERPL-MCNC: 76 MG/DL (ref 0–149)

## 2022-07-16 PROCEDURE — 82570 ASSAY OF URINE CREATININE: CPT

## 2022-07-16 PROCEDURE — 82043 UR ALBUMIN QUANTITATIVE: CPT

## 2022-07-16 PROCEDURE — 80061 LIPID PANEL: CPT

## 2022-07-16 PROCEDURE — 36415 COLL VENOUS BLD VENIPUNCTURE: CPT

## 2022-07-16 PROCEDURE — 80053 COMPREHEN METABOLIC PANEL: CPT

## 2022-10-11 ENCOUNTER — IMMUNIZATION (OUTPATIENT)
Dept: OCCUPATIONAL MEDICINE | Facility: CLINIC | Age: 37
End: 2022-10-11

## 2022-10-11 DIAGNOSIS — Z23 NEED FOR VACCINATION: Primary | ICD-10-CM

## 2022-10-11 PROCEDURE — 90686 IIV4 VACC NO PRSV 0.5 ML IM: CPT | Performed by: NURSE PRACTITIONER

## 2022-10-19 ENCOUNTER — APPOINTMENT (RX ONLY)
Dept: URBAN - METROPOLITAN AREA CLINIC 6 | Facility: CLINIC | Age: 37
Setting detail: DERMATOLOGY
End: 2022-10-19

## 2022-10-19 DIAGNOSIS — L70.0 ACNE VULGARIS: ICD-10-CM

## 2022-10-19 PROCEDURE — 99212 OFFICE O/P EST SF 10 MIN: CPT | Mod: 25

## 2022-10-19 PROCEDURE — ? INTRALESIONAL KENALOG

## 2022-10-19 PROCEDURE — ? DIAGNOSIS COMMENT

## 2022-10-19 PROCEDURE — ? COUNSELING

## 2022-10-19 PROCEDURE — 11900 INJECT SKIN LESIONS </W 7: CPT

## 2022-10-19 PROCEDURE — ? SEPARATE AND IDENTIFIABLE DOCUMENTATION

## 2022-10-19 ASSESSMENT — LOCATION DETAILED DESCRIPTION DERM: LOCATION DETAILED: LEFT LATERAL MANDIBULAR CHEEK

## 2022-10-19 ASSESSMENT — LOCATION SIMPLE DESCRIPTION DERM: LOCATION SIMPLE: LEFT CHEEK

## 2022-10-19 ASSESSMENT — LOCATION ZONE DERM: LOCATION ZONE: FACE

## 2022-10-19 NOTE — PROCEDURE: COUNSELING
Topical Sulfur Applications Counseling: Topical Sulfur Counseling: Patient counseled that this medication may cause skin irritation or allergic reactions.  In the event of skin irritation, the patient was advised to reduce the amount of the drug applied or use it less frequently.   The patient verbalized understanding of the proper use and possible adverse effects of topical sulfur application.  All of the patient's questions and concerns were addressed.
Dapsone Counseling: I discussed with the patient the risks of dapsone including but not limited to hemolytic anemia, agranulocytosis, rashes, methemoglobinemia, kidney failure, peripheral neuropathy, headaches, GI upset, and liver toxicity.  Patients who start dapsone require monitoring including baseline LFTs and weekly CBCs for the first month, then every month thereafter.  The patient verbalized understanding of the proper use and possible adverse effects of dapsone.  All of the patient's questions and concerns were addressed.
Isotretinoin Pregnancy And Lactation Text: This medication is Pregnancy Category X and is considered extremely dangerous during pregnancy. It is unknown if it is excreted in breast milk.
Spironolactone Counseling: Patient advised regarding risks of diarrhea, abdominal pain, hyperkalemia, birth defects (for female patients), liver toxicity and renal toxicity. The patient may need blood work to monitor liver and kidney function and potassium levels while on therapy. The patient verbalized understanding of the proper use and possible adverse effects of spironolactone.  All of the patient's questions and concerns were addressed.
Topical Retinoid Pregnancy And Lactation Text: This medication is Pregnancy Category C. It is unknown if this medication is excreted in breast milk.
High Dose Vitamin A Pregnancy And Lactation Text: High dose vitamin A therapy is contraindicated during pregnancy and breast feeding.
Tetracycline Counseling: Patient counseled regarding possible photosensitivity and increased risk for sunburn.  Patient instructed to avoid sunlight, if possible.  When exposed to sunlight, patients should wear protective clothing, sunglasses, and sunscreen.  The patient was instructed to call the office immediately if the following severe adverse effects occur:  hearing changes, easy bruising/bleeding, severe headache, or vision changes.  The patient verbalized understanding of the proper use and possible adverse effects of tetracycline.  All of the patient's questions and concerns were addressed. Patient understands to avoid pregnancy while on therapy due to potential birth defects.
Doxycycline Counseling:  Patient counseled regarding possible photosensitivity and increased risk for sunburn.  Patient instructed to avoid sunlight, if possible.  When exposed to sunlight, patients should wear protective clothing, sunglasses, and sunscreen.  The patient was instructed to call the office immediately if the following severe adverse effects occur:  hearing changes, easy bruising/bleeding, severe headache, or vision changes.  The patient verbalized understanding of the proper use and possible adverse effects of doxycycline.  All of the patient's questions and concerns were addressed.
Azithromycin Pregnancy And Lactation Text: This medication is considered safe during pregnancy and is also secreted in breast milk.
Benzoyl Peroxide Pregnancy And Lactation Text: This medication is Pregnancy Category C. It is unknown if benzoyl peroxide is excreted in breast milk.
Winlevi Counseling:  I discussed with the patient the risks of topical clascoterone including but not limited to erythema, scaling, itching, and stinging. Patient voiced their understanding.
Erythromycin Pregnancy And Lactation Text: This medication is Pregnancy Category B and is considered safe during pregnancy. It is also excreted in breast milk.
Sarecycline Counseling: Patient advised regarding possible photosensitivity and discoloration of the teeth, skin, lips, tongue and gums.  Patient instructed to avoid sunlight, if possible.  When exposed to sunlight, patients should wear protective clothing, sunglasses, and sunscreen.  The patient was instructed to call the office immediately if the following severe adverse effects occur:  hearing changes, easy bruising/bleeding, severe headache, or vision changes.  The patient verbalized understanding of the proper use and possible adverse effects of sarecycline.  All of the patient's questions and concerns were addressed.
Birth Control Pills Counseling: Birth Control Pill Counseling: I discussed with the patient the potential side effects of OCPs including but not limited to increased risk of stroke, heart attack, thrombophlebitis, deep venous thrombosis, hepatic adenomas, breast changes, GI upset, headaches, and depression.  The patient verbalized understanding of the proper use and possible adverse effects of OCPs. All of the patient's questions and concerns were addressed.
Aklief Pregnancy And Lactation Text: It is unknown if this medication is safe to use during pregnancy.  It is unknown if this medication is excreted in breast milk.  Breastfeeding women should use the topical cream on the smallest area of the skin for the shortest time needed while breastfeeding.  Do not apply to nipple and areola.
Tazorac Counseling:  Patient advised that medication is irritating and drying.  Patient may need to apply sparingly and wash off after an hour before eventually leaving it on overnight.  The patient verbalized understanding of the proper use and possible adverse effects of tazorac.  All of the patient's questions and concerns were addressed.
Minocycline Counseling: Patient advised regarding possible photosensitivity and discoloration of the teeth, skin, lips, tongue and gums.  Patient instructed to avoid sunlight, if possible.  When exposed to sunlight, patients should wear protective clothing, sunglasses, and sunscreen.  The patient was instructed to call the office immediately if the following severe adverse effects occur:  hearing changes, easy bruising/bleeding, severe headache, or vision changes.  The patient verbalized understanding of the proper use and possible adverse effects of minocycline.  All of the patient's questions and concerns were addressed.
Tetracycline Pregnancy And Lactation Text: This medication is Pregnancy Category D and not consider safe during pregnancy. It is also excreted in breast milk.
Bactrim Counseling:  I discussed with the patient the risks of sulfa antibiotics including but not limited to GI upset, allergic reaction, drug rash, diarrhea, dizziness, photosensitivity, and yeast infections.  Rarely, more serious reactions can occur including but not limited to aplastic anemia, agranulocytosis, methemoglobinemia, blood dyscrasias, liver or kidney failure, lung infiltrates or desquamative/blistering drug rashes.
Doxycycline Pregnancy And Lactation Text: This medication is Pregnancy Category D and not consider safe during pregnancy. It is also excreted in breast milk but is considered safe for shorter treatment courses.
Azelaic Acid Pregnancy And Lactation Text: This medication is considered safe during pregnancy and breast feeding.
Use Enhanced Medication Counseling?: No
Topical Clindamycin Counseling: Patient counseled that this medication may cause skin irritation or allergic reactions.  In the event of skin irritation, the patient was advised to reduce the amount of the drug applied or use it less frequently.   The patient verbalized understanding of the proper use and possible adverse effects of clindamycin.  All of the patient's questions and concerns were addressed.
Azithromycin Counseling:  I discussed with the patient the risks of azithromycin including but not limited to GI upset, allergic reaction, drug rash, diarrhea, and yeast infections.
Dapsone Pregnancy And Lactation Text: This medication is Pregnancy Category C and is not considered safe during pregnancy or breast feeding.
High Dose Vitamin A Counseling: Side effects reviewed, pt to contact office should one occur.
Spironolactone Pregnancy And Lactation Text: This medication can cause feminization of the male fetus and should be avoided during pregnancy. The active metabolite is also found in breast milk.
Benzoyl Peroxide Counseling: Patient counseled that medicine may cause skin irritation and bleach clothing.  In the event of skin irritation, the patient was advised to reduce the amount of the drug applied or use it less frequently.   The patient verbalized understanding of the proper use and possible adverse effects of benzoyl peroxide.  All of the patient's questions and concerns were addressed.
Detail Level: Detailed
Winlevi Pregnancy And Lactation Text: This medication is considered safe during pregnancy and breastfeeding.
Topical Retinoid counseling:  Patient advised to apply a pea-sized amount only at bedtime and wait 30 minutes after washing their face before applying.  If too drying, patient may add a non-comedogenic moisturizer. The patient verbalized understanding of the proper use and possible adverse effects of retinoids.  All of the patient's questions and concerns were addressed.
Topical Sulfur Applications Pregnancy And Lactation Text: This medication is Pregnancy Category C and has an unknown safety profile during pregnancy. It is unknown if this topical medication is excreted in breast milk.
Aklief counseling:  Patient advised to apply a pea-sized amount only at bedtime and wait 30 minutes after washing their face before applying.  If too drying, patient may add a non-comedogenic moisturizer.  The most commonly reported side effects including irritation, redness, scaling, dryness, stinging, burning, itching, and increased risk of sunburn.  The patient verbalized understanding of the proper use and possible adverse effects of retinoids.  All of the patient's questions and concerns were addressed.
Bactrim Pregnancy And Lactation Text: This medication is Pregnancy Category D and is known to cause fetal risk.  It is also excreted in breast milk.
Erythromycin Counseling:  I discussed with the patient the risks of erythromycin including but not limited to GI upset, allergic reaction, drug rash, diarrhea, increase in liver enzymes, and yeast infections.
Topical Clindamycin Pregnancy And Lactation Text: This medication is Pregnancy Category B and is considered safe during pregnancy. It is unknown if it is excreted in breast milk.
Azelaic Acid Counseling: Patient counseled that medicine may cause skin irritation and to avoid applying near the eyes.  In the event of skin irritation, the patient was advised to reduce the amount of the drug applied or use it less frequently.   The patient verbalized understanding of the proper use and possible adverse effects of azelaic acid.  All of the patient's questions and concerns were addressed.
Isotretinoin Counseling: Patient should get monthly blood tests, not donate blood, not drive at night if vision affected, not share medication, and not undergo elective surgery for 6 months after tx completed. Side effects reviewed, pt to contact office should one occur.
Birth Control Pills Pregnancy And Lactation Text: This medication should be avoided if pregnant and for the first 30 days post-partum.
Tazorac Pregnancy And Lactation Text: This medication is not safe during pregnancy. It is unknown if this medication is excreted in breast milk.

## 2022-10-19 NOTE — PROCEDURE: DIAGNOSIS COMMENT
Comment: Offered doxy vs. ilk. Pt opts for ilk. Discussed risk of atrophy. Pt verbalizes understanding
Detail Level: Simple
Render Risk Assessment In Note?: no

## 2022-10-19 NOTE — PROCEDURE: INTRALESIONAL KENALOG
Size Of Lesion (Optional): 0.5
Concentration Of Kenalog Solution Injected (Mg/Ml): 2.5
Kenalog Preparation: Kenalog
Include Z78.9 (Other Specified Conditions Influencing Health Status) As An Associated Diagnosis?: No
Consent: The risks of atrophy were reviewed with the patient.
How Many Mls Were Removed From The 10 Mg/Ml (5ml) Vial When Preparing The Injectable Solution?: 0.3
Expiration Date For Kenalog (Optional): Feb 2024
Total Volume (Ccs): 0.05
Which Kenalog Vial Was Used?: Kenalog 10 mg/ml (5 ml vial)
How Many Mls Were Removed From The 40 Mg/Ml (5ml) Vial When Preparing The Injectable Solution?: 0
Administered By (Optional): Ruth Bryant
Lot # For Kenalog (Optional): [id number]
Treatment Number (Optional): 1
Ndc# For Kenalog Only: 9634-5536-73
Validate Note Data When Using Inventory: Yes
Detail Level: Detailed
Medical Necessity Clause: This procedure was medically necessary because the lesions that were treated were:

## 2022-11-08 ENCOUNTER — APPOINTMENT (RX ONLY)
Dept: URBAN - METROPOLITAN AREA CLINIC 35 | Facility: CLINIC | Age: 37
Setting detail: DERMATOLOGY
End: 2022-11-08

## 2022-11-08 DIAGNOSIS — L70.0 ACNE VULGARIS: ICD-10-CM

## 2022-11-08 DIAGNOSIS — L70.8 OTHER ACNE: ICD-10-CM

## 2022-11-08 PROCEDURE — ? ADDITIONAL NOTES

## 2022-11-08 PROCEDURE — 99214 OFFICE O/P EST MOD 30 MIN: CPT | Mod: 25

## 2022-11-08 PROCEDURE — ? INTRALESIONAL KENALOG

## 2022-11-08 PROCEDURE — ? DIAGNOSIS COMMENT

## 2022-11-08 PROCEDURE — ? COUNSELING

## 2022-11-08 PROCEDURE — ? TREATMENT REGIMEN

## 2022-11-08 PROCEDURE — ? PRESCRIPTION

## 2022-11-08 PROCEDURE — ? SEPARATE AND IDENTIFIABLE DOCUMENTATION

## 2022-11-08 PROCEDURE — 11900 INJECT SKIN LESIONS </W 7: CPT

## 2022-11-08 RX ORDER — DOXYCYCLINE HYCLATE 100 MG/1
1 CAPSULE, GELATIN COATED ORAL DAILY
Qty: 30 | Refills: 3 | Status: ERX | COMMUNITY
Start: 2022-11-08

## 2022-11-08 RX ADMIN — DOXYCYCLINE HYCLATE 1: 100 CAPSULE, GELATIN COATED ORAL at 00:00

## 2022-11-08 ASSESSMENT — LOCATION DETAILED DESCRIPTION DERM
LOCATION DETAILED: LEFT CHIN
LOCATION DETAILED: MID POSTERIOR NECK
LOCATION DETAILED: LEFT LATERAL MANDIBULAR CHEEK
LOCATION DETAILED: RIGHT SUPERIOR LATERAL BUCCAL CHEEK

## 2022-11-08 ASSESSMENT — LOCATION SIMPLE DESCRIPTION DERM
LOCATION SIMPLE: POSTERIOR NECK
LOCATION SIMPLE: LEFT CHEEK
LOCATION SIMPLE: CHIN
LOCATION SIMPLE: RIGHT CHEEK

## 2022-11-08 ASSESSMENT — LOCATION ZONE DERM
LOCATION ZONE: NECK
LOCATION ZONE: FACE

## 2022-11-08 NOTE — PROCEDURE: MIPS QUALITY
Quality 226: Preventive Care And Screening: Tobacco Use: Screening And Cessation Intervention: Patient screened for tobacco use and is an ex/non-smoker
Quality 130: Documentation Of Current Medications In The Medical Record: Current Medications Documented
Detail Level: Generalized
Quality 402: Tobacco Use And Help With Quitting Among Adolescents: Patient screened for tobacco and never smoked

## 2022-11-08 NOTE — PROCEDURE: INTRALESIONAL KENALOG
Size Of Lesion (Optional): 0.5
Concentration Of Kenalog Solution Injected (Mg/Ml): 2.5
Kenalog Preparation: Kenalog
Include Z78.9 (Other Specified Conditions Influencing Health Status) As An Associated Diagnosis?: No
Consent: The risks of atrophy were reviewed with the patient.
How Many Mls Were Removed From The 10 Mg/Ml (5ml) Vial When Preparing The Injectable Solution?: 0.3
Expiration Date For Kenalog (Optional): Feb 2024
Total Volume (Ccs): 0.05
Which Kenalog Vial Was Used?: Kenalog 10 mg/ml (5 ml vial)
How Many Mls Were Removed From The 40 Mg/Ml (5ml) Vial When Preparing The Injectable Solution?: 0
Administered By (Optional): Ruth Bryant
Lot # For Kenalog (Optional): [id number]
Treatment Number (Optional): 2
Ndc# For Kenalog Only: 8273-7297-23
Validate Note Data When Using Inventory: Yes
Detail Level: Detailed
Medical Necessity Clause: This procedure was medically necessary because the lesions that were treated were:

## 2022-11-08 NOTE — PROCEDURE: COUNSELING
Topical Sulfur Applications Counseling: Topical Sulfur Counseling: Patient counseled that this medication may cause skin irritation or allergic reactions.  In the event of skin irritation, the patient was advised to reduce the amount of the drug applied or use it less frequently.   The patient verbalized understanding of the proper use and possible adverse effects of topical sulfur application.  All of the patient's questions and concerns were addressed.
Dapsone Counseling: I discussed with the patient the risks of dapsone including but not limited to hemolytic anemia, agranulocytosis, rashes, methemoglobinemia, kidney failure, peripheral neuropathy, headaches, GI upset, and liver toxicity.  Patients who start dapsone require monitoring including baseline LFTs and weekly CBCs for the first month, then every month thereafter.  The patient verbalized understanding of the proper use and possible adverse effects of dapsone.  All of the patient's questions and concerns were addressed.
Isotretinoin Pregnancy And Lactation Text: This medication is Pregnancy Category X and is considered extremely dangerous during pregnancy. It is unknown if it is excreted in breast milk.
Spironolactone Counseling: Patient advised regarding risks of diarrhea, abdominal pain, hyperkalemia, birth defects (for female patients), liver toxicity and renal toxicity. The patient may need blood work to monitor liver and kidney function and potassium levels while on therapy. The patient verbalized understanding of the proper use and possible adverse effects of spironolactone.  All of the patient's questions and concerns were addressed.
Topical Retinoid Pregnancy And Lactation Text: This medication is Pregnancy Category C. It is unknown if this medication is excreted in breast milk.
High Dose Vitamin A Pregnancy And Lactation Text: High dose vitamin A therapy is contraindicated during pregnancy and breast feeding.
Tetracycline Counseling: Patient counseled regarding possible photosensitivity and increased risk for sunburn.  Patient instructed to avoid sunlight, if possible.  When exposed to sunlight, patients should wear protective clothing, sunglasses, and sunscreen.  The patient was instructed to call the office immediately if the following severe adverse effects occur:  hearing changes, easy bruising/bleeding, severe headache, or vision changes.  The patient verbalized understanding of the proper use and possible adverse effects of tetracycline.  All of the patient's questions and concerns were addressed. Patient understands to avoid pregnancy while on therapy due to potential birth defects.
Doxycycline Counseling:  Patient counseled regarding possible photosensitivity and increased risk for sunburn.  Patient instructed to avoid sunlight, if possible.  When exposed to sunlight, patients should wear protective clothing, sunglasses, and sunscreen.  The patient was instructed to call the office immediately if the following severe adverse effects occur:  hearing changes, easy bruising/bleeding, severe headache, or vision changes.  The patient verbalized understanding of the proper use and possible adverse effects of doxycycline.  All of the patient's questions and concerns were addressed.
Azithromycin Pregnancy And Lactation Text: This medication is considered safe during pregnancy and is also secreted in breast milk.
Benzoyl Peroxide Pregnancy And Lactation Text: This medication is Pregnancy Category C. It is unknown if benzoyl peroxide is excreted in breast milk.
Winlevi Counseling:  I discussed with the patient the risks of topical clascoterone including but not limited to erythema, scaling, itching, and stinging. Patient voiced their understanding.
Erythromycin Pregnancy And Lactation Text: This medication is Pregnancy Category B and is considered safe during pregnancy. It is also excreted in breast milk.
Sarecycline Counseling: Patient advised regarding possible photosensitivity and discoloration of the teeth, skin, lips, tongue and gums.  Patient instructed to avoid sunlight, if possible.  When exposed to sunlight, patients should wear protective clothing, sunglasses, and sunscreen.  The patient was instructed to call the office immediately if the following severe adverse effects occur:  hearing changes, easy bruising/bleeding, severe headache, or vision changes.  The patient verbalized understanding of the proper use and possible adverse effects of sarecycline.  All of the patient's questions and concerns were addressed.
Birth Control Pills Counseling: Birth Control Pill Counseling: I discussed with the patient the potential side effects of OCPs including but not limited to increased risk of stroke, heart attack, thrombophlebitis, deep venous thrombosis, hepatic adenomas, breast changes, GI upset, headaches, and depression.  The patient verbalized understanding of the proper use and possible adverse effects of OCPs. All of the patient's questions and concerns were addressed.
Aklief Pregnancy And Lactation Text: It is unknown if this medication is safe to use during pregnancy.  It is unknown if this medication is excreted in breast milk.  Breastfeeding women should use the topical cream on the smallest area of the skin for the shortest time needed while breastfeeding.  Do not apply to nipple and areola.
Tazorac Counseling:  Patient advised that medication is irritating and drying.  Patient may need to apply sparingly and wash off after an hour before eventually leaving it on overnight.  The patient verbalized understanding of the proper use and possible adverse effects of tazorac.  All of the patient's questions and concerns were addressed.
Minocycline Counseling: Patient advised regarding possible photosensitivity and discoloration of the teeth, skin, lips, tongue and gums.  Patient instructed to avoid sunlight, if possible.  When exposed to sunlight, patients should wear protective clothing, sunglasses, and sunscreen.  The patient was instructed to call the office immediately if the following severe adverse effects occur:  hearing changes, easy bruising/bleeding, severe headache, or vision changes.  The patient verbalized understanding of the proper use and possible adverse effects of minocycline.  All of the patient's questions and concerns were addressed.
Tetracycline Pregnancy And Lactation Text: This medication is Pregnancy Category D and not consider safe during pregnancy. It is also excreted in breast milk.
Bactrim Counseling:  I discussed with the patient the risks of sulfa antibiotics including but not limited to GI upset, allergic reaction, drug rash, diarrhea, dizziness, photosensitivity, and yeast infections.  Rarely, more serious reactions can occur including but not limited to aplastic anemia, agranulocytosis, methemoglobinemia, blood dyscrasias, liver or kidney failure, lung infiltrates or desquamative/blistering drug rashes.
Doxycycline Pregnancy And Lactation Text: This medication is Pregnancy Category D and not consider safe during pregnancy. It is also excreted in breast milk but is considered safe for shorter treatment courses.
Azelaic Acid Pregnancy And Lactation Text: This medication is considered safe during pregnancy and breast feeding.
Use Enhanced Medication Counseling?: No
Topical Clindamycin Counseling: Patient counseled that this medication may cause skin irritation or allergic reactions.  In the event of skin irritation, the patient was advised to reduce the amount of the drug applied or use it less frequently.   The patient verbalized understanding of the proper use and possible adverse effects of clindamycin.  All of the patient's questions and concerns were addressed.
Azithromycin Counseling:  I discussed with the patient the risks of azithromycin including but not limited to GI upset, allergic reaction, drug rash, diarrhea, and yeast infections.
Dapsone Pregnancy And Lactation Text: This medication is Pregnancy Category C and is not considered safe during pregnancy or breast feeding.
High Dose Vitamin A Counseling: Side effects reviewed, pt to contact office should one occur.
Spironolactone Pregnancy And Lactation Text: This medication can cause feminization of the male fetus and should be avoided during pregnancy. The active metabolite is also found in breast milk.
Benzoyl Peroxide Counseling: Patient counseled that medicine may cause skin irritation and bleach clothing.  In the event of skin irritation, the patient was advised to reduce the amount of the drug applied or use it less frequently.   The patient verbalized understanding of the proper use and possible adverse effects of benzoyl peroxide.  All of the patient's questions and concerns were addressed.
Detail Level: Detailed
Winlevi Pregnancy And Lactation Text: This medication is considered safe during pregnancy and breastfeeding.
Topical Retinoid counseling:  Patient advised to apply a pea-sized amount only at bedtime and wait 30 minutes after washing their face before applying.  If too drying, patient may add a non-comedogenic moisturizer. The patient verbalized understanding of the proper use and possible adverse effects of retinoids.  All of the patient's questions and concerns were addressed.
Topical Sulfur Applications Pregnancy And Lactation Text: This medication is Pregnancy Category C and has an unknown safety profile during pregnancy. It is unknown if this topical medication is excreted in breast milk.
Aklief counseling:  Patient advised to apply a pea-sized amount only at bedtime and wait 30 minutes after washing their face before applying.  If too drying, patient may add a non-comedogenic moisturizer.  The most commonly reported side effects including irritation, redness, scaling, dryness, stinging, burning, itching, and increased risk of sunburn.  The patient verbalized understanding of the proper use and possible adverse effects of retinoids.  All of the patient's questions and concerns were addressed.
Bactrim Pregnancy And Lactation Text: This medication is Pregnancy Category D and is known to cause fetal risk.  It is also excreted in breast milk.
Erythromycin Counseling:  I discussed with the patient the risks of erythromycin including but not limited to GI upset, allergic reaction, drug rash, diarrhea, increase in liver enzymes, and yeast infections.
Topical Clindamycin Pregnancy And Lactation Text: This medication is Pregnancy Category B and is considered safe during pregnancy. It is unknown if it is excreted in breast milk.
Azelaic Acid Counseling: Patient counseled that medicine may cause skin irritation and to avoid applying near the eyes.  In the event of skin irritation, the patient was advised to reduce the amount of the drug applied or use it less frequently.   The patient verbalized understanding of the proper use and possible adverse effects of azelaic acid.  All of the patient's questions and concerns were addressed.
Isotretinoin Counseling: Patient should get monthly blood tests, not donate blood, not drive at night if vision affected, not share medication, and not undergo elective surgery for 6 months after tx completed. Side effects reviewed, pt to contact office should one occur.
Birth Control Pills Pregnancy And Lactation Text: This medication should be avoided if pregnant and for the first 30 days post-partum.
Tazorac Pregnancy And Lactation Text: This medication is not safe during pregnancy. It is unknown if this medication is excreted in breast milk.
Detail Level: Zone

## 2022-11-08 NOTE — PROCEDURE: TREATMENT REGIMEN
Hide Aquaphor Products: No
Continue Regimen: - Winlevi twice a day\\n\\n- metronidazole
Action 3: Continue
Initiate Regimen: - doxycycline 100mg daily
Detail Level: Zone
Plan: Will switch to spironolactone instead of doxycycline if pts PCP approves of spironolactone with her hypertensive medication

## 2022-11-08 NOTE — PROCEDURE: ADDITIONAL NOTES
Render Risk Assessment In Note?: yes
Additional Notes: Pt will ask her PCP about spirinolactone with her blood pressure medicine. In the mean time she will start doxycycline
Detail Level: Simple

## 2022-11-28 ENCOUNTER — EH NON-PROVIDER (OUTPATIENT)
Dept: OCCUPATIONAL MEDICINE | Facility: CLINIC | Age: 37
End: 2022-11-28

## 2022-11-28 DIAGNOSIS — Z02.89 ENCOUNTER FOR OCCUPATIONAL HEALTH ASSESSMENT: ICD-10-CM

## 2022-11-28 PROCEDURE — 94375 RESPIRATORY FLOW VOLUME LOOP: CPT | Performed by: PREVENTIVE MEDICINE

## 2023-03-06 ENCOUNTER — APPOINTMENT (RX ONLY)
Dept: URBAN - METROPOLITAN AREA CLINIC 35 | Facility: CLINIC | Age: 38
Setting detail: DERMATOLOGY
End: 2023-03-06

## 2023-03-06 DIAGNOSIS — Z41.9 ENCOUNTER FOR PROCEDURE FOR PURPOSES OTHER THAN REMEDYING HEALTH STATE, UNSPECIFIED: ICD-10-CM

## 2023-03-06 DIAGNOSIS — L70.8 OTHER ACNE: ICD-10-CM

## 2023-03-06 PROCEDURE — ? TREATMENT REGIMEN

## 2023-03-06 PROCEDURE — ? PRESCRIPTION

## 2023-03-06 PROCEDURE — ? COSMETIC CONSULTATION - MICRO-NEEDLING

## 2023-03-06 PROCEDURE — 99213 OFFICE O/P EST LOW 20 MIN: CPT

## 2023-03-06 PROCEDURE — ? COUNSELING

## 2023-03-06 RX ORDER — TRETIONIN 0.25 MG/G
1 CREAM TOPICAL
Qty: 45 | Refills: 11 | Status: ERX | COMMUNITY
Start: 2023-03-06

## 2023-03-06 RX ADMIN — TRETIONIN 1: 0.25 CREAM TOPICAL at 00:00

## 2023-03-06 ASSESSMENT — LOCATION ZONE DERM: LOCATION ZONE: FACE

## 2023-03-06 ASSESSMENT — LOCATION DETAILED DESCRIPTION DERM: LOCATION DETAILED: LEFT CHIN

## 2023-03-06 ASSESSMENT — LOCATION SIMPLE DESCRIPTION DERM: LOCATION SIMPLE: CHIN

## 2023-03-06 NOTE — PROCEDURE: TREATMENT REGIMEN
Hide Aquaphor Products: No
Action 3: Continue
Hold Regimen: - Winlevi twice a day\\n\\n- doxycycline 100mg daily\\n\\n- metronidazole\\nPt stopped these
Discontinue Regimen: Spironolactone
Initiate Regimen: - tretinoin 0.025 % topical cream. Apply pea size amount to entire face at night, may use with a moisturizer
Detail Level: Zone
Plan: 3/2023 pt states colton spiked her blood sugars over 300 as a type 1 diabetic. Discussed microneedling for scars

## 2023-03-06 NOTE — PROCEDURE: COUNSELING
Doxycycline Pregnancy And Lactation Text: This medication is Pregnancy Category D and not consider safe during pregnancy. It is also excreted in breast milk but is considered safe for shorter treatment courses.
Tetracycline Counseling: Patient counseled regarding possible photosensitivity and increased risk for sunburn.  Patient instructed to avoid sunlight, if possible.  When exposed to sunlight, patients should wear protective clothing, sunglasses, and sunscreen.  The patient was instructed to call the office immediately if the following severe adverse effects occur:  hearing changes, easy bruising/bleeding, severe headache, or vision changes.  The patient verbalized understanding of the proper use and possible adverse effects of tetracycline.  All of the patient's questions and concerns were addressed. Patient understands to avoid pregnancy while on therapy due to potential birth defects.
Sarecycline Counseling: Patient advised regarding possible photosensitivity and discoloration of the teeth, skin, lips, tongue and gums.  Patient instructed to avoid sunlight, if possible.  When exposed to sunlight, patients should wear protective clothing, sunglasses, and sunscreen.  The patient was instructed to call the office immediately if the following severe adverse effects occur:  hearing changes, easy bruising/bleeding, severe headache, or vision changes.  The patient verbalized understanding of the proper use and possible adverse effects of sarecycline.  All of the patient's questions and concerns were addressed.
Topical Clindamycin Pregnancy And Lactation Text: This medication is Pregnancy Category B and is considered safe during pregnancy. It is unknown if it is excreted in breast milk.
Topical Retinoid Pregnancy And Lactation Text: This medication is Pregnancy Category C. It is unknown if this medication is excreted in breast milk.
Use Enhanced Medication Counseling?: No
Birth Control Pills Pregnancy And Lactation Text: This medication should be avoided if pregnant and for the first 30 days post-partum.
Azithromycin Pregnancy And Lactation Text: This medication is considered safe during pregnancy and is also secreted in breast milk.
Topical Sulfur Applications Counseling: Topical Sulfur Counseling: Patient counseled that this medication may cause skin irritation or allergic reactions.  In the event of skin irritation, the patient was advised to reduce the amount of the drug applied or use it less frequently.   The patient verbalized understanding of the proper use and possible adverse effects of topical sulfur application.  All of the patient's questions and concerns were addressed.
High Dose Vitamin A Counseling: Side effects reviewed, pt to contact office should one occur.
Erythromycin Counseling:  I discussed with the patient the risks of erythromycin including but not limited to GI upset, allergic reaction, drug rash, diarrhea, increase in liver enzymes, and yeast infections.
Tetracycline Pregnancy And Lactation Text: This medication is Pregnancy Category D and not consider safe during pregnancy. It is also excreted in breast milk.
Dapsone Counseling: I discussed with the patient the risks of dapsone including but not limited to hemolytic anemia, agranulocytosis, rashes, methemoglobinemia, kidney failure, peripheral neuropathy, headaches, GI upset, and liver toxicity.  Patients who start dapsone require monitoring including baseline LFTs and weekly CBCs for the first month, then every month thereafter.  The patient verbalized understanding of the proper use and possible adverse effects of dapsone.  All of the patient's questions and concerns were addressed.
Detail Level: Zone
High Dose Vitamin A Pregnancy And Lactation Text: High dose vitamin A therapy is contraindicated during pregnancy and breast feeding.
Tazorac Counseling:  Patient advised that medication is irritating and drying.  Patient may need to apply sparingly and wash off after an hour before eventually leaving it on overnight.  The patient verbalized understanding of the proper use and possible adverse effects of tazorac.  All of the patient's questions and concerns were addressed.
Benzoyl Peroxide Counseling: Patient counseled that medicine may cause skin irritation and bleach clothing.  In the event of skin irritation, the patient was advised to reduce the amount of the drug applied or use it less frequently.   The patient verbalized understanding of the proper use and possible adverse effects of benzoyl peroxide.  All of the patient's questions and concerns were addressed.
Bactrim Counseling:  I discussed with the patient the risks of sulfa antibiotics including but not limited to GI upset, allergic reaction, drug rash, diarrhea, dizziness, photosensitivity, and yeast infections.  Rarely, more serious reactions can occur including but not limited to aplastic anemia, agranulocytosis, methemoglobinemia, blood dyscrasias, liver or kidney failure, lung infiltrates or desquamative/blistering drug rashes.
Topical Sulfur Applications Pregnancy And Lactation Text: This medication is Pregnancy Category C and has an unknown safety profile during pregnancy. It is unknown if this topical medication is excreted in breast milk.
Spironolactone Counseling: Patient advised regarding risks of diarrhea, abdominal pain, hyperkalemia, birth defects (for female patients), liver toxicity and renal toxicity. The patient may need blood work to monitor liver and kidney function and potassium levels while on therapy. The patient verbalized understanding of the proper use and possible adverse effects of spironolactone.  All of the patient's questions and concerns were addressed.
Erythromycin Pregnancy And Lactation Text: This medication is Pregnancy Category B and is considered safe during pregnancy. It is also excreted in breast milk.
Tazorac Pregnancy And Lactation Text: This medication is not safe during pregnancy. It is unknown if this medication is excreted in breast milk.
Dapsone Pregnancy And Lactation Text: This medication is Pregnancy Category C and is not considered safe during pregnancy or breast feeding.
Bactrim Pregnancy And Lactation Text: This medication is Pregnancy Category D and is known to cause fetal risk.  It is also excreted in breast milk.
Minocycline Counseling: Patient advised regarding possible photosensitivity and discoloration of the teeth, skin, lips, tongue and gums.  Patient instructed to avoid sunlight, if possible.  When exposed to sunlight, patients should wear protective clothing, sunglasses, and sunscreen.  The patient was instructed to call the office immediately if the following severe adverse effects occur:  hearing changes, easy bruising/bleeding, severe headache, or vision changes.  The patient verbalized understanding of the proper use and possible adverse effects of minocycline.  All of the patient's questions and concerns were addressed.
Isotretinoin Counseling: Patient should get monthly blood tests, not donate blood, not drive at night if vision affected, not share medication, and not undergo elective surgery for 6 months after tx completed. Side effects reviewed, pt to contact office should one occur.
Benzoyl Peroxide Pregnancy And Lactation Text: This medication is Pregnancy Category C. It is unknown if benzoyl peroxide is excreted in breast milk.
Doxycycline Counseling:  Patient counseled regarding possible photosensitivity and increased risk for sunburn.  Patient instructed to avoid sunlight, if possible.  When exposed to sunlight, patients should wear protective clothing, sunglasses, and sunscreen.  The patient was instructed to call the office immediately if the following severe adverse effects occur:  hearing changes, easy bruising/bleeding, severe headache, or vision changes.  The patient verbalized understanding of the proper use and possible adverse effects of doxycycline.  All of the patient's questions and concerns were addressed.
Spironolactone Pregnancy And Lactation Text: This medication can cause feminization of the male fetus and should be avoided during pregnancy. The active metabolite is also found in breast milk.
Birth Control Pills Counseling: Birth Control Pill Counseling: I discussed with the patient the potential side effects of OCPs including but not limited to increased risk of stroke, heart attack, thrombophlebitis, deep venous thrombosis, hepatic adenomas, breast changes, GI upset, headaches, and depression.  The patient verbalized understanding of the proper use and possible adverse effects of OCPs. All of the patient's questions and concerns were addressed.
Topical Clindamycin Counseling: Patient counseled that this medication may cause skin irritation or allergic reactions.  In the event of skin irritation, the patient was advised to reduce the amount of the drug applied or use it less frequently.   The patient verbalized understanding of the proper use and possible adverse effects of clindamycin.  All of the patient's questions and concerns were addressed.
Azithromycin Counseling:  I discussed with the patient the risks of azithromycin including but not limited to GI upset, allergic reaction, drug rash, diarrhea, and yeast infections.
Isotretinoin Pregnancy And Lactation Text: This medication is Pregnancy Category X and is considered extremely dangerous during pregnancy. It is unknown if it is excreted in breast milk.
Topical Retinoid counseling:  Patient advised to apply a pea-sized amount only at bedtime and wait 30 minutes after washing their face before applying.  If too drying, patient may add a non-comedogenic moisturizer. The patient verbalized understanding of the proper use and possible adverse effects of retinoids.  All of the patient's questions and concerns were addressed.
Azelaic Acid Pregnancy And Lactation Text: This medication is considered safe during pregnancy and breast feeding.
Winlevi Counseling:  I discussed with the patient the risks of topical clascoterone including but not limited to erythema, scaling, itching, and stinging. Patient voiced their understanding.
Aklief Pregnancy And Lactation Text: It is unknown if this medication is safe to use during pregnancy.  It is unknown if this medication is excreted in breast milk.  Breastfeeding women should use the topical cream on the smallest area of the skin for the shortest time needed while breastfeeding.  Do not apply to nipple and areola.
Winlevi Pregnancy And Lactation Text: This medication is considered safe during pregnancy and breastfeeding.
Aklief counseling:  Patient advised to apply a pea-sized amount only at bedtime and wait 30 minutes after washing their face before applying.  If too drying, patient may add a non-comedogenic moisturizer.  The most commonly reported side effects including irritation, redness, scaling, dryness, stinging, burning, itching, and increased risk of sunburn.  The patient verbalized understanding of the proper use and possible adverse effects of retinoids.  All of the patient's questions and concerns were addressed.
Azelaic Acid Counseling: Patient counseled that medicine may cause skin irritation and to avoid applying near the eyes.  In the event of skin irritation, the patient was advised to reduce the amount of the drug applied or use it less frequently.   The patient verbalized understanding of the proper use and possible adverse effects of azelaic acid.  All of the patient's questions and concerns were addressed.

## 2023-04-10 ENCOUNTER — APPOINTMENT (RX ONLY)
Dept: URBAN - METROPOLITAN AREA CLINIC 4 | Facility: CLINIC | Age: 38
Setting detail: DERMATOLOGY
End: 2023-04-10

## 2023-04-10 DIAGNOSIS — L0391 CELLULITIS AND ABSCESS OF UNSPECIFIED SITES: ICD-10-CM

## 2023-04-10 DIAGNOSIS — L70.0 ACNE VULGARIS: ICD-10-CM

## 2023-04-10 DIAGNOSIS — L0390 CELLULITIS AND ABSCESS OF UNSPECIFIED SITES: ICD-10-CM

## 2023-04-10 PROBLEM — L02.11 CUTANEOUS ABSCESS OF NECK: Status: ACTIVE | Noted: 2023-04-10

## 2023-04-10 PROCEDURE — 99213 OFFICE O/P EST LOW 20 MIN: CPT | Mod: 25

## 2023-04-10 PROCEDURE — ? ORDER TESTS

## 2023-04-10 PROCEDURE — ? ADDITIONAL NOTES

## 2023-04-10 PROCEDURE — ? COUNSELING

## 2023-04-10 PROCEDURE — 10060 I&D ABSCESS SIMPLE/SINGLE: CPT

## 2023-04-10 PROCEDURE — ? INCISION AND DRAINAGE

## 2023-04-10 ASSESSMENT — LOCATION SIMPLE DESCRIPTION DERM
LOCATION SIMPLE: RIGHT ANTERIOR NECK
LOCATION SIMPLE: LEFT ANTERIOR NECK

## 2023-04-10 ASSESSMENT — LOCATION DETAILED DESCRIPTION DERM
LOCATION DETAILED: LEFT INFERIOR ANTERIOR NECK
LOCATION DETAILED: RIGHT SUPERIOR ANTERIOR NECK

## 2023-04-10 ASSESSMENT — LOCATION ZONE DERM: LOCATION ZONE: NECK

## 2023-04-10 NOTE — PROCEDURE: ORDER TESTS
Expected Date Of Service: 04/10/2023
Performing Laboratory: 0
Bill For Surgical Tray: no
Clinical Notes (To The Lab): standing order every month for the next 6 months.
Billing Type: Third-Party Bill
Performing Laboratory: [phone]

## 2023-04-10 NOTE — PROCEDURE: COUNSELING
Winlevi Counseling:  I discussed with the patient the risks of topical clascoterone including but not limited to erythema, scaling, itching, and stinging. Patient voiced their understanding.
Aklief counseling:  Patient advised to apply a pea-sized amount only at bedtime and wait 30 minutes after washing their face before applying.  If too drying, patient may add a non-comedogenic moisturizer.  The most commonly reported side effects including irritation, redness, scaling, dryness, stinging, burning, itching, and increased risk of sunburn.  The patient verbalized understanding of the proper use and possible adverse effects of retinoids.  All of the patient's questions and concerns were addressed.
Minocycline Pregnancy And Lactation Text: This medication is Pregnancy Category D and not consider safe during pregnancy. It is also excreted in breast milk.
Topical Retinoid Pregnancy And Lactation Text: This medication is Pregnancy Category C. It is unknown if this medication is excreted in breast milk.
Include Pregnancy/Lactation Warning?: No
High Dose Vitamin A Pregnancy And Lactation Text: High dose vitamin A therapy is contraindicated during pregnancy and breast feeding.
Tetracycline Counseling: Patient counseled regarding possible photosensitivity and increased risk for sunburn.  Patient instructed to avoid sunlight, if possible.  When exposed to sunlight, patients should wear protective clothing, sunglasses, and sunscreen.  The patient was instructed to call the office immediately if the following severe adverse effects occur:  hearing changes, easy bruising/bleeding, severe headache, or vision changes.  The patient verbalized understanding of the proper use and possible adverse effects of tetracycline.  All of the patient's questions and concerns were addressed. Patient understands to avoid pregnancy while on therapy due to potential birth defects.
Doxycycline Counseling:  Patient counseled regarding possible photosensitivity and increased risk for sunburn.  Patient instructed to avoid sunlight, if possible.  When exposed to sunlight, patients should wear protective clothing, sunglasses, and sunscreen.  The patient was instructed to call the office immediately if the following severe adverse effects occur:  hearing changes, easy bruising/bleeding, severe headache, or vision changes.  The patient verbalized understanding of the proper use and possible adverse effects of doxycycline.  All of the patient's questions and concerns were addressed.
Azithromycin Pregnancy And Lactation Text: This medication is considered safe during pregnancy and is also secreted in breast milk.
Erythromycin Counseling:  I discussed with the patient the risks of erythromycin including but not limited to GI upset, allergic reaction, drug rash, diarrhea, increase in liver enzymes, and yeast infections.
Bactrim Pregnancy And Lactation Text: This medication is Pregnancy Category D and is known to cause fetal risk.  It is also excreted in breast milk.
Sunscreen Recommendations: Apply a sun screen moisturizing product in the morning time 30 minutes prior to sun exposure
Detail Level: Detailed
Dapsone Counseling: I discussed with the patient the risks of dapsone including but not limited to hemolytic anemia, agranulocytosis, rashes, methemoglobinemia, kidney failure, peripheral neuropathy, headaches, GI upset, and liver toxicity.  Patients who start dapsone require monitoring including baseline LFTs and weekly CBCs for the first month, then every month thereafter.  The patient verbalized understanding of the proper use and possible adverse effects of dapsone.  All of the patient's questions and concerns were addressed.
Topical Clindamycin Counseling: Patient counseled that this medication may cause skin irritation or allergic reactions.  In the event of skin irritation, the patient was advised to reduce the amount of the drug applied or use it less frequently.   The patient verbalized understanding of the proper use and possible adverse effects of clindamycin.  All of the patient's questions and concerns were addressed.
Azelaic Acid Pregnancy And Lactation Text: This medication is considered safe during pregnancy and breast feeding.
Isotretinoin Pregnancy And Lactation Text: This medication is Pregnancy Category X and is considered extremely dangerous during pregnancy. It is unknown if it is excreted in breast milk.
Spironolactone Counseling: Patient advised regarding risks of diarrhea, abdominal pain, hyperkalemia, birth defects (for female patients), liver toxicity and renal toxicity. The patient may need blood work to monitor liver and kidney function and potassium levels while on therapy. The patient verbalized understanding of the proper use and possible adverse effects of spironolactone.  All of the patient's questions and concerns were addressed.
Cleanser Recommendations: To use a mild facial cleanser at bedtime as directed
Birth Control Pills Counseling: Birth Control Pill Counseling: I discussed with the patient the potential side effects of OCPs including but not limited to increased risk of stroke, heart attack, thrombophlebitis, deep venous thrombosis, hepatic adenomas, breast changes, GI upset, headaches, and depression.  The patient verbalized understanding of the proper use and possible adverse effects of OCPs. All of the patient's questions and concerns were addressed.
Topical Sulfur Applications Counseling: Topical Sulfur Counseling: Patient counseled that this medication may cause skin irritation or allergic reactions.  In the event of skin irritation, the patient was advised to reduce the amount of the drug applied or use it less frequently.   The patient verbalized understanding of the proper use and possible adverse effects of topical sulfur application.  All of the patient's questions and concerns were addressed.
Topical Retinoids Recommendations: Apply Epiduo gel to acne prone areas as directed at bedtime
Bpo Recommendations: Patient to apply Epiduo at bedtime as directed
Benzoyl Peroxide Pregnancy And Lactation Text: This medication is Pregnancy Category C. It is unknown if benzoyl peroxide is excreted in breast milk.
Bactrim Counseling:  I discussed with the patient the risks of sulfa antibiotics including but not limited to GI upset, allergic reaction, drug rash, diarrhea, dizziness, photosensitivity, and yeast infections.  Rarely, more serious reactions can occur including but not limited to aplastic anemia, agranulocytosis, methemoglobinemia, blood dyscrasias, liver or kidney failure, lung infiltrates or desquamative/blistering drug rashes.
Moisturizer Recommendations: Apply a moisturizing cream at bedtime after application of Epiduo
Minocycline Counseling: Patient advised regarding possible photosensitivity and discoloration of the teeth, skin, lips, tongue and gums.  Patient instructed to avoid sunlight, if possible.  When exposed to sunlight, patients should wear protective clothing, sunglasses, and sunscreen.  The patient was instructed to call the office immediately if the following severe adverse effects occur:  hearing changes, easy bruising/bleeding, severe headache, or vision changes.  The patient verbalized understanding of the proper use and possible adverse effects of minocycline.  All of the patient's questions and concerns were addressed.
Doxycycline Pregnancy And Lactation Text: This medication is Pregnancy Category D and not consider safe during pregnancy. It is also excreted in breast milk but is considered safe for shorter treatment courses.
Topical Retinoid counseling:  Patient advised to apply a pea-sized amount only at bedtime and wait 30 minutes after washing their face before applying.  If too drying, patient may add a non-comedogenic moisturizer. The patient verbalized understanding of the proper use and possible adverse effects of retinoids.  All of the patient's questions and concerns were addressed.
Topical Sulfur Applications Pregnancy And Lactation Text: This medication is Pregnancy Category C and has an unknown safety profile during pregnancy. It is unknown if this topical medication is excreted in breast milk.
Tazorac Counseling:  Patient advised that medication is irritating and drying.  Patient may need to apply sparingly and wash off after an hour before eventually leaving it on overnight.  The patient verbalized understanding of the proper use and possible adverse effects of tazorac.  All of the patient's questions and concerns were addressed.
Aklief Pregnancy And Lactation Text: It is unknown if this medication is safe to use during pregnancy.  It is unknown if this medication is excreted in breast milk.  Breastfeeding women should use the topical cream on the smallest area of the skin for the shortest time needed while breastfeeding.  Do not apply to nipple and areola.
Winlevi Pregnancy And Lactation Text: This medication is considered safe during pregnancy and breastfeeding.
Erythromycin Pregnancy And Lactation Text: This medication is Pregnancy Category B and is considered safe during pregnancy. It is also excreted in breast milk.
Sarecycline Counseling: Patient advised regarding possible photosensitivity and discoloration of the teeth, skin, lips, tongue and gums.  Patient instructed to avoid sunlight, if possible.  When exposed to sunlight, patients should wear protective clothing, sunglasses, and sunscreen.  The patient was instructed to call the office immediately if the following severe adverse effects occur:  hearing changes, easy bruising/bleeding, severe headache, or vision changes.  The patient verbalized understanding of the proper use and possible adverse effects of sarecycline.  All of the patient's questions and concerns were addressed.
Birth Control Pills Pregnancy And Lactation Text: This medication should be avoided if pregnant and for the first 30 days post-partum.
Topical Clindamycin Pregnancy And Lactation Text: This medication is Pregnancy Category B and is considered safe during pregnancy. It is unknown if it is excreted in breast milk.
Azelaic Acid Counseling: Patient counseled that medicine may cause skin irritation and to avoid applying near the eyes.  In the event of skin irritation, the patient was advised to reduce the amount of the drug applied or use it less frequently.   The patient verbalized understanding of the proper use and possible adverse effects of azelaic acid.  All of the patient's questions and concerns were addressed.
Tazorac Pregnancy And Lactation Text: This medication is not safe during pregnancy. It is unknown if this medication is excreted in breast milk.
Isotretinoin Counseling: Patient should get monthly blood tests, not donate blood, not drive at night if vision affected, not share medication, and not undergo elective surgery for 6 months after tx completed. Side effects reviewed, pt to contact office should one occur.
High Dose Vitamin A Counseling: Side effects reviewed, pt to contact office should one occur.
Spironolactone Pregnancy And Lactation Text: This medication can cause feminization of the male fetus and should be avoided during pregnancy. The active metabolite is also found in breast milk.
Benzoyl Peroxide Counseling: Patient counseled that medicine may cause skin irritation and bleach clothing.  In the event of skin irritation, the patient was advised to reduce the amount of the drug applied or use it less frequently.   The patient verbalized understanding of the proper use and possible adverse effects of benzoyl peroxide.  All of the patient's questions and concerns were addressed.
Azithromycin Counseling:  I discussed with the patient the risks of azithromycin including but not limited to GI upset, allergic reaction, drug rash, diarrhea, and yeast infections.
Dapsone Pregnancy And Lactation Text: This medication is Pregnancy Category C and is not considered safe during pregnancy or breast feeding.

## 2023-04-10 NOTE — PROCEDURE: INCISION AND DRAINAGE
Detail Level: Detailed
Lesion Type: Abscess
Method: 11 blade
Curette: No
Size Of Lesion In Cm (Optional But May Be Required For Some Insurances): 0
Drainage Amount?: minimal
Drainage Type?: purulent
Wound Care: Petrolatum
Dressing: dry sterile dressing
Epidermal Sutures: 4-0 Ethilon
Epidermal Closure: simple interrupted
Suture Text: The incision was partially closed with
Preparation Text: The area was prepped in the usual clean fashion.
Curette Text (Optional): After the contents were expressed a curette was used to partially remove the cyst wall.
Consent was obtained and risks were reviewed including but not limited to delayed wound healing, infection, need for multiple I and D's, and pain.
Post-Care Instructions: I reviewed with the patient in detail post-care instructions. Patient should keep wound covered and call the office should any redness, pain, swelling or worsening occur.

## 2023-04-10 NOTE — PROCEDURE: ADDITIONAL NOTES
Detail Level: Simple
Additional Notes: Will start the process for accutane and sign up pt for iPLEDGE.
Render Risk Assessment In Note?: no

## 2023-10-18 ENCOUNTER — PHARMACY VISIT (OUTPATIENT)
Dept: PHARMACY | Facility: MEDICAL CENTER | Age: 38
End: 2023-10-18
Payer: COMMERCIAL

## 2023-10-18 PROCEDURE — RXMED WILLOW AMBULATORY MEDICATION CHARGE: Performed by: INTERNAL MEDICINE

## 2023-10-18 RX ORDER — INFLUENZA A VIRUS A/BRISBANE/02/2018 IVR-190 (H1N1) ANTIGEN (FORMALDEHYDE INACTIVATED), INFLUENZA A VIRUS A/KANSAS/14/2017 X-327 (H3N2) ANTIGEN (FORMALDEHYDE INACTIVATED), INFLUENZA B VIRUS B/PHUKET/3073/2013 ANTIGEN (FORMALDEHYDE INACTIVATED), AND INFLUENZA B VIRUS B/MARYLAND/15/2016 BX-69A ANTIGEN (FORMALDEHYDE INACTIVATED) 15; 15; 15; 15 UG/.5ML; UG/.5ML; UG/.5ML; UG/.5ML
INJECTION, SUSPENSION INTRAMUSCULAR
Qty: 0.5 ML | Refills: 0 | OUTPATIENT
Start: 2023-10-18

## 2023-12-01 ENCOUNTER — HOSPITAL ENCOUNTER (OUTPATIENT)
Dept: LAB | Facility: MEDICAL CENTER | Age: 38
End: 2023-12-01
Attending: FAMILY MEDICINE
Payer: COMMERCIAL

## 2023-12-01 ENCOUNTER — HOSPITAL ENCOUNTER (OUTPATIENT)
Dept: LAB | Facility: MEDICAL CENTER | Age: 38
End: 2023-12-01
Attending: PHYSICIAN ASSISTANT
Payer: COMMERCIAL

## 2023-12-01 LAB
ALBUMIN SERPL BCP-MCNC: 4.8 G/DL (ref 3.2–4.9)
ALBUMIN/GLOB SERPL: 1.9 G/DL
ALP SERPL-CCNC: 66 U/L (ref 30–99)
ALT SERPL-CCNC: 15 U/L (ref 2–50)
ANION GAP SERPL CALC-SCNC: 9 MMOL/L (ref 7–16)
AST SERPL-CCNC: 16 U/L (ref 12–45)
BILIRUB SERPL-MCNC: 0.6 MG/DL (ref 0.1–1.5)
BUN SERPL-MCNC: 14 MG/DL (ref 8–22)
CALCIUM ALBUM COR SERPL-MCNC: 8.8 MG/DL (ref 8.5–10.5)
CALCIUM SERPL-MCNC: 9.4 MG/DL (ref 8.5–10.5)
CHLORIDE SERPL-SCNC: 105 MMOL/L (ref 96–112)
CHOLEST SERPL-MCNC: 200 MG/DL (ref 100–199)
CO2 SERPL-SCNC: 26 MMOL/L (ref 20–33)
CREAT SERPL-MCNC: 0.73 MG/DL (ref 0.5–1.4)
CREAT UR-MCNC: 85.14 MG/DL
FASTING STATUS PATIENT QL REPORTED: NORMAL
GFR SERPLBLD CREATININE-BSD FMLA CKD-EPI: 109 ML/MIN/1.73 M 2
GLOBULIN SER CALC-MCNC: 2.5 G/DL (ref 1.9–3.5)
GLUCOSE SERPL-MCNC: 76 MG/DL (ref 65–99)
HDLC SERPL-MCNC: 72 MG/DL
LDLC SERPL CALC-MCNC: 112 MG/DL
MICROALBUMIN UR-MCNC: <1.2 MG/DL
MICROALBUMIN/CREAT UR: NORMAL MG/G (ref 0–30)
POTASSIUM SERPL-SCNC: 4 MMOL/L (ref 3.6–5.5)
PROT SERPL-MCNC: 7.3 G/DL (ref 6–8.2)
SODIUM SERPL-SCNC: 140 MMOL/L (ref 135–145)
THYROPEROXIDASE AB SERPL-ACNC: 14 IU/ML (ref 0–9)
TRIGL SERPL-MCNC: 80 MG/DL (ref 0–149)

## 2023-12-01 PROCEDURE — 84206 ASSAY OF PROINSULIN: CPT

## 2023-12-01 PROCEDURE — 83525 ASSAY OF INSULIN: CPT

## 2023-12-01 PROCEDURE — 82043 UR ALBUMIN QUANTITATIVE: CPT

## 2023-12-01 PROCEDURE — 82570 ASSAY OF URINE CREATININE: CPT

## 2023-12-01 PROCEDURE — 86341 ISLET CELL ANTIBODY: CPT

## 2023-12-01 PROCEDURE — 86337 INSULIN ANTIBODIES: CPT

## 2023-12-01 PROCEDURE — 86376 MICROSOMAL ANTIBODY EACH: CPT

## 2023-12-01 PROCEDURE — 80061 LIPID PANEL: CPT

## 2023-12-01 PROCEDURE — 80053 COMPREHEN METABOLIC PANEL: CPT

## 2023-12-01 PROCEDURE — 84681 ASSAY OF C-PEPTIDE: CPT

## 2023-12-02 LAB — C PEPTIDE SERPL-MCNC: <0.1 NG/ML (ref 0.5–3.3)

## 2023-12-03 LAB — GAD65 AB SER IA-ACNC: 6.3 IU/ML (ref 0–5)

## 2023-12-04 LAB — INSULIN HUMAN AB SER-ACNC: 6.8 U/ML (ref 0–0.4)

## 2023-12-11 ENCOUNTER — EH NON-PROVIDER (OUTPATIENT)
Dept: OCCUPATIONAL MEDICINE | Facility: CLINIC | Age: 38
End: 2023-12-11

## 2023-12-11 DIAGNOSIS — Z02.89 ENCOUNTER FOR OCCUPATIONAL HEALTH EXAMINATION INVOLVING RESPIRATOR: ICD-10-CM

## 2023-12-11 PROCEDURE — 94375 RESPIRATORY FLOW VOLUME LOOP: CPT | Performed by: PREVENTIVE MEDICINE

## 2023-12-12 LAB — MISCELLANEOUS LAB RESULT MISCLAB: ABNORMAL

## 2024-10-03 ENCOUNTER — IMMUNIZATION (OUTPATIENT)
Dept: OCCUPATIONAL MEDICINE | Facility: CLINIC | Age: 39
End: 2024-10-03

## 2024-10-03 DIAGNOSIS — Z23 NEED FOR VACCINATION: Primary | ICD-10-CM

## 2024-10-03 PROCEDURE — 90656 IIV3 VACC NO PRSV 0.5 ML IM: CPT | Performed by: PREVENTIVE MEDICINE

## 2024-10-05 NOTE — PROGRESS NOTES
Patient transferred to room 206 via bed with belongings. Bands and ID verified with HILLARY Jackson. Bedside report given. No complications occurred during transfer of care.   Pt. Here for OB/FU today. Reports [facility].   Good # [phone]  Pt states no complaints.    Pharmacy verified.   Pt states has a follow up with Dr. Chauhan 2 weeks.

## 2024-12-12 ENCOUNTER — EH NON-PROVIDER (OUTPATIENT)
Dept: OCCUPATIONAL MEDICINE | Facility: CLINIC | Age: 39
End: 2024-12-12

## 2024-12-12 PROCEDURE — 94375 RESPIRATORY FLOW VOLUME LOOP: CPT | Performed by: PREVENTIVE MEDICINE

## (undated) DEVICE — SET EXTENSION WITH 2 PORTS (48EA/CA) ***PART #2C8610 IS A SUBSTITUTE*****

## (undated) DEVICE — SUTUREABS02-0 CT1 27IN - (36EA/BX)

## (undated) DEVICE — WATER IRRIG. STER. 1500 ML - (9/CA)

## (undated) DEVICE — SODIUM CHL IRRIGATION 0.9% 1000ML (12EA/CA)

## (undated) DEVICE — HEAD HOLDER JUNIOR/ADULT

## (undated) DEVICE — PACK C-SECTION (2EA/CA)

## (undated) DEVICE — CATHETER IV NON-SAFETY 18 GA X 1 1/4 (50/BX 4BX/CA)

## (undated) DEVICE — SUTURE 1 CHROMIC GUTCT-1 27 (36PK/BX)"

## (undated) DEVICE — TUBING CLEARLINK DUO-VENT - C-FLO (48EA/CA)

## (undated) DEVICE — STAPLER SKIN DISP - (6/BX 10BX/CA) VISISTAT

## (undated) DEVICE — GLOVE BIOGEL SZ 6 PF LATEX - (50EA/BX 4BX/CA)

## (undated) DEVICE — SUTURE 0 VICRYL PLUS CT-1 - 36 INCH (36/BX)

## (undated) DEVICE — GLOVES, #7 1/2 BIOGEL M

## (undated) DEVICE — TRAY SPINAL ANESTHESIA NON-SAFETY (10/CA)

## (undated) DEVICE — KIT  I.V. START (100EA/CA)

## (undated) DEVICE — DETERGENT RENUZYME PLUS 10 OZ PACKET (50/BX)

## (undated) DEVICE — ELECTRODE DUAL RETURN W/ CORD - (50/PK)